# Patient Record
Sex: FEMALE | Race: BLACK OR AFRICAN AMERICAN | Employment: OTHER | URBAN - METROPOLITAN AREA
[De-identification: names, ages, dates, MRNs, and addresses within clinical notes are randomized per-mention and may not be internally consistent; named-entity substitution may affect disease eponyms.]

---

## 2017-05-31 ENCOUNTER — OFFICE VISIT (OUTPATIENT)
Dept: ORTHOPEDIC SURGERY | Age: 62
End: 2017-05-31

## 2017-05-31 VITALS
DIASTOLIC BLOOD PRESSURE: 67 MMHG | TEMPERATURE: 99.5 F | HEIGHT: 63 IN | HEART RATE: 98 BPM | SYSTOLIC BLOOD PRESSURE: 136 MMHG

## 2017-05-31 DIAGNOSIS — M25.571 BILATERAL ANKLE PAIN, UNSPECIFIED CHRONICITY: ICD-10-CM

## 2017-05-31 DIAGNOSIS — M25.572 BILATERAL ANKLE PAIN, UNSPECIFIED CHRONICITY: ICD-10-CM

## 2017-05-31 DIAGNOSIS — M67.02 ACHILLES TENDON CONTRACTURE, LEFT: ICD-10-CM

## 2017-05-31 DIAGNOSIS — M25.471 RIGHT ANKLE SWELLING: ICD-10-CM

## 2017-05-31 DIAGNOSIS — E11.42 DIABETIC POLYNEUROPATHY ASSOCIATED WITH TYPE 2 DIABETES MELLITUS (HCC): ICD-10-CM

## 2017-05-31 DIAGNOSIS — M25.472 ANKLE SWELLING, LEFT: Primary | ICD-10-CM

## 2017-05-31 RX ORDER — PREDNISONE 20 MG/1
TABLET ORAL
COMMUNITY

## 2017-05-31 RX ORDER — BUDESONIDE AND FORMOTEROL FUMARATE DIHYDRATE 160; 4.5 UG/1; UG/1
2 AEROSOL RESPIRATORY (INHALATION) 2 TIMES DAILY
COMMUNITY

## 2017-05-31 NOTE — PATIENT INSTRUCTIONS
Please follow up after PVL Studies (4 weeks). You are advised to contact us if your condition worsens. The provider has ordered a custom brace/orthotic for you. This will be customized and made for you by an outside facility. Please contact the orthotist at one of the below offices for your custom fitting and follow up in the office with the doctor or his physicians assistant 3 weeks after you have been wearing the brace. Rosangela Cruz at [facility]:     [address] [place] [address] Prem Mccauley    Phone: [phone]    You have been provided with an order for durable medical equipment that you may  at an outside facility as our office does not carry the equipment you need. You may pick it up at any medical supply company you like. Listed below are a few different locations for your convenience:    [address] [place] [address], [address] [place] [address] Street  Phone: [phone]    Leg and Ankle Edema: Care Instructions  Your Care Instructions  Swelling in the legs, ankles, and feet is called edema. It is common after you sit or stand for a while. Long plane flights or car rides often cause swelling in the legs and feet. You may also have swelling if you have to stand for long periods of time at your job. Problems with the veins in the legs (varicose veins) and changes in hormones can also cause swelling. Sometimes the swelling in the ankles and feet is caused by a more serious problem, such as heart failure, infection, blood clots, or liver or kidney disease. Follow-up care is a key part of your treatment and safety. Be sure to make and go to all appointments, and call your doctor if you are having problems. Its also a good idea to know your test results and keep a list of the medicines you take. How can you care for yourself at home? · If your doctor gave you medicine, take it as prescribed. Call your doctor if you think you are having a problem with your medicine.   · Whenever you are resting, raise your legs up. Try to keep the swollen area higher than the level of your heart. · Take breaks from standing or sitting in one position. ¨ Walk around to increase the blood flow in your lower legs. ¨ Move your feet and ankles often while you stand, or tighten and relax your leg muscles. · Wear support stockings. Put them on in the morning, before swelling gets worse. · Eat a balanced diet. Lose weight if you need to. · Limit the amount of salt (sodium) in your diet. Salt holds fluid in the body and may increase swelling. When should you call for help? Call 911 anytime you think you may need emergency care. For example, call if:  · You have symptoms of a blood clot in your lung (called a pulmonary embolism). These may include:  ¨ Sudden chest pain. ¨ Trouble breathing. ¨ Coughing up blood. Call your doctor now or seek immediate medical care if:  · You have signs of a blood clot, such as:  ¨ Pain in your calf, back of the knee, thigh, or groin. ¨ Redness and swelling in your leg or groin. · You have symptoms of infection, such as:  ¨ Increased pain, swelling, warmth, or redness. ¨ Red streaks or pus. ¨ A fever. Watch closely for changes in your health, and be sure to contact your doctor if:  · Your swelling is getting worse. · You have new or worsening pain in your legs. · You do not get better as expected. Where can you learn more? Go to http://don-myriam.info/. Enter F492 in the search box to learn more about \"Leg and Ankle Edema: Care Instructions. \"  Current as of: May 27, 2016  Content Version: 11.2  © 4515-7767 [employer]. Care instructions adapted under license by [employer] (which disclaims liability or warranty for this information).  If you have questions about a medical condition or this instruction, always ask your healthcare professional. [address]  any warranty or liability for your use of this information.

## 2017-05-31 NOTE — PROGRESS NOTES
AMBULATORY PROGRESS NOTE      Patient: Letty Rivas             MRN: [id number]     SSN: [id number] There is no height or weight on file to calculate BMI. YOB: 1955     AGE: 57 y.o. EX: female    PCP: No primary care provider on file. IMPRESSION/DIAGNOSIS AND TREATMENT PLAN     DIAGNOSES  1. Peripheral edema    2. Bilateral ankle pain, unspecified chronicity    3. Achilles tendon contracture, left    4. Diabetic polyneuropathy associated with type 2 diabetes mellitus ([address]        Orders Placed This Encounter    AMB SUPPLY ORDER    AMB SUPPLY ORDER    [10868] Ankle Min 3V    [10466] Ankle Min 3V    DUPLEX LOWER EXT VENOUS BILAT    REFERRAL TO NEUROLOGY      Letty Rivas understands her diagnoses and the proposed plan. My impression is a 77-year-old female with:    1. Diabetic peripheral neuropathy: I am going to get her to see a neurologist to manage her neuropathy. She has tried Lyrica in the past.  She is taking Neurontin as ordered by her PCP, Dr. Cortney Trivedi, but she is still having neuropathy and this really has not helped her much in terms of alleviating the burning pain in her lower legs. 2. Lower extremity edema:  Recommendation is for a PVL venous study of her lower legs, not a STAT study, to assess the valves of her legs. I am recommending compression stockings. 3. Difficulty walking due to weakness of her left Achilles tendon and some decreased range of motion of her left ankle and neuropathy:  Recommendation is for a hinged AFO brace. I am going to have her see Colleen Farley for custom hinged AFO braces. Plan:    1) PVL Venous Study BLE  2) Knee High Compression Stockings 8-10 mm Hg  3) Custom bilateral short hinged AFO braces  4) Referral to Neurology    RTO - after PVL Venous studies; 4 weeks    HPI AND EXAMINATION     Letty Rivas IS A 57 y.o. female who presents to my outpatient office complaining of: bilateral ankle pain and swelling.  Patient states that she has \"stocking/glove\" sensation across both of her legs. She also reports intermittent swelling. She endorses the swelling can cause her pain at times. Patient has a h/o diabetic neuropathy and DM. Patient is ambulating with a wheelchair and using oxygen. Patient states that her face became swollen after taking Lyrica. Medications include Gabapentin prescribed by her PCP. Pilar Feliz is alert/oriented (name, location, time) and follows commands well. she  is in no acute distress and her affect and mood are appropriate. Bilateral ANKLE and FOOT     Gait: wheelchair bound  Tenderness: mild diffuse tenderness of feet. Cutaneous: No rashes, skin patches, wounds, or abrasions to the lower legs           Warm and Normal color. No regions of expressible drainage. Skin color, texture, turgor normal. Normal.  Joint Motion: ROM Ankle: Decreased, Hindfoot: (ST,TN,CC) Decreased, Forefoot toes: Decreased    Weakness to dorsiflexion on the left side  Neurologic Exam: Neuro: Motor: no muscle wasting or atrophy; left-sided weakrnes to the LLE and              Sensory: Stocking glove sensation bilaterally. )dec sensation diffuse foot/ankle bilateral  Contractures: Gastrocnemius or Achilles Contractures moderate on the left side  Joint / Tendon Stability: No Ankle or Subtalar instability or joint laxity. No peroneal sublux ability or dislocation  Alignment: Normal Foot Alignment and Semi-Flexible  Vascular: 2+ distal tibia edema bilaterally  Lymphatic:  No Evidence of Lymphedema. CHART REVIEW     Past Medical History:   Diagnosis Date    Chronic obstructive pulmonary disease ([facility]     Hypertension     Sarcoidosis ([facility]      Current Outpatient Prescriptions   Medication Sig    budesonide-formoterol (SYMBICORT) 160-4.5 mcg/actuation HFA inhaler Take 2 Puffs by inhalation two (2) times a day.  denosumab (PROLIA) 60 mg/mL injection 60 mg by SubCUTAneous route.     predniSONE (DELTASONE) 20 mg tablet Take  by mouth daily (with breakfast). No current facility-administered medications for this visit. No Known Allergies  History reviewed. No pertinent surgical history. Social History     Occupational History    Not on file. Social History Main Topics    Smoking status: Never Smoker    Smokeless tobacco: Never Used    Alcohol use No    Drug use: No    Sexual activity: Not on file     History reviewed. No pertinent family history. REVIEW OF SYSTEMS : 5/31/2017  ALL BELOW ARE Negative except : SEE HPI       Constitutional: Negative for fever, chills and weight loss. Neg Weigh Loss  Cardiovascular: Negative for chest pain, claudication and leg swelling. SOB, WADE   Gastrointestinal: Negative for  pain, N/V/D/C, Blood in stool or urine,dysuria, hematuria,        Incontinence, pelvic pain  Musculoskeletal: see HPI. Neurological: Negative for dizziness and weakness. Negative for headaches,Visual Changes, Confusion, Seizures,   Psychiatric/Behavioral: Negative for depression, memory loss and substance abuse. Extremities:  Negative for  hair changes, rash or skin lesion changes. Hematologic: Negative for Bleeding problems, bruising, pallor or swollen lymph nodes. Peripheral Vascular: No calf pain, vascular vein tenderness to calf pain              No calf throbbing, posterior knee throbbing pain    DIAGNOSTIC IMAGING      Dictation on: 05/31/2017  9:52 AM by: Ameena Dougherty [[id number]]         Written by Елеан Pinto, as dictated by Bharat Verdin MD. Dr. MARIUM, Bharat Verdin MD, confirm that all documentation is accurate.

## 2017-05-31 NOTE — MR AVS SNAPSHOT
Visit Information Date & Time Provider Department Dept. Phone Encounter #  
 5/31/2017 10:00 AM Salty Ardon, [address] [facility] [phone] [id number] Follow-up Instructions Return in about 1 month (around 6/30/2017). Upcoming Health Maintenance Date Due Hepatitis C Screening 1955 DTaP/Tdap/Td series (1 - Tdap) 7/4/1975 PAP AKA CERVICAL CYTOLOGY 7/4/1975 BREAST CANCER SCRN MAMMOGRAM 7/4/2004 FOBT Q 1 YEAR AGE 50-75 7/4/2004 ZOSTER VACCINE AGE 60> 7/4/2014 INFLUENZA AGE 9 TO ADULT 8/1/2017 Allergies as of 5/31/2017  Review Complete On: 5/31/2017 By: Salty Ardon MD  
 No Known Allergies Current Immunizations  Never Reviewed No immunizations on file. Not reviewed this visit You Were Diagnosed With   
  
 Codes Comments Peripheral edema    -  Primary ICD-10-CM: R60.9 ICD-9-CM: 638. 3 Bilateral ankle pain, unspecified chronicity     ICD-10-CM: M25.571, M25.572 ICD-9-CM: 719.47 Achilles tendon contracture, left     ICD-10-CM: M67.02 
ICD-9-CM: 727.81 Diabetic polyneuropathy associated with type 2 diabetes mellitus ([facility]     ICD-10-CM: E11.42 
ICD-9-CM: 250.60, 357.2 Vitals BP Pulse Temp Height(growth percentile) Smoking Status 136/67 98 99.5 °F (37.5 °C) (Oral) 5' 3\" (1.6 m) Never Smoker Your Updated Medication List  
  
   
This list is accurate as of: 5/31/17 10:23 AM.  Always use your most recent med list.  
  
  
  
  
 predniSONE 20 mg tablet Commonly known as:  Marivel Raoul Take  by mouth daily (with breakfast). PROLIA 60 mg/mL injection Generic drug:  denosumab 60 mg by SubCUTAneous route. SYMBICORT 160-4.5 mcg/actuation HFA inhaler Generic drug:  budesonide-formoterol Take 2 Puffs by inhalation two (2) times a day. We Performed the Following AMB POC XRAY, ANKLE; COMPLETE, 3+ VIE [77477 CPT(R)] AMB POC XRAY, ANKLE; COMPLETE, 3+ VIE [75266 CPT(R)] AMB SUPPLY ORDER [[id number] Custom] Comments:  
 Knee High Compression Stockings 8-10 mm Hg AMB SUPPLY ORDER [[id number] Custom] Comments:  
 Order date: 5/31/2017 Custom bilateral short hinged AFO braces REFERRAL TO NEUROLOGY [VKL29 Custom] Comments:  
 Evaluation and treatment of BLE diabetic neuropathy. Follow-up Instructions Return in about 1 month (around 6/30/2017). To-Do List   
 05/31/2017 Imaging:  DUPLEX LOWER EXT VENOUS BILAT Referral Information Referral ID Referred By Referred To  
  
 [id number] Nancy Alejandre MD   
   [address] [facility] [address] [place], [address]  Phone: [phone] Fax: [phone] Visits Status Start Date End Date 1 New Request 5/31/17 5/31/18 If your referral has a status of pending review or denied, additional information will be sent to support the outcome of this decision. Patient Instructions Please follow up after PVL Studies (4 weeks). You are advised to contact us if your condition worsens. The provider has ordered a custom brace/orthotic for you. This will be customized and made for you by an outside facility. Please contact the orthotist at one of the below offices for your custom fitting and follow up in the office with the doctor or his physicians assistant 3 weeks after you have been wearing the brace. Loyd Hidden at [facility]:  
 
[address] [place] [address] Phone: [phone] You have been provided with an order for durable medical equipment that you may  at an outside facility as our office does not carry the equipment you need. You may pick it up at any medical supply company you like. Listed below are a few different locations for your convenience: [facility] [address] [place] [address] Street Phone: [phone] Leg and Ankle Edema: Care Instructions Your Care Instructions Swelling in the legs, ankles, and feet is called edema. It is common after you sit or stand for a while. Long plane flights or car rides often cause swelling in the legs and feet. You may also have swelling if you have to stand for long periods of time at your job. Problems with the veins in the legs (varicose veins) and changes in hormones can also cause swelling. Sometimes the swelling in the ankles and feet is caused by a more serious problem, such as heart failure, infection, blood clots, or liver or kidney disease. Follow-up care is a key part of your treatment and safety. Be sure to make and go to all appointments, and call your doctor if you are having problems. Its also a good idea to know your test results and keep a list of the medicines you take. How can you care for yourself at home? · If your doctor gave you medicine, take it as prescribed. Call your doctor if you think you are having a problem with your medicine. · Whenever you are resting, raise your legs up. Try to keep the swollen area higher than the level of your heart. · Take breaks from standing or sitting in one position. ¨ Walk around to increase the blood flow in your lower legs. ¨ Move your feet and ankles often while you stand, or tighten and relax your leg muscles. · Wear support stockings. Put them on in the morning, before swelling gets worse. · Eat a balanced diet. Lose weight if you need to. · Limit the amount of salt (sodium) in your diet. Salt holds fluid in the body and may increase swelling. When should you call for help? Call 911 anytime you think you may need emergency care. For example, call if: 
· You have symptoms of a blood clot in your lung (called a pulmonary embolism). These may include: 
¨ Sudden chest pain. ¨ Trouble breathing. ¨ Coughing up blood. Call your doctor now or seek immediate medical care if: · You have signs of a blood clot, such as: 
¨ Pain in your calf, back of the knee, thigh, or groin. ¨ Redness and swelling in your leg or groin. · You have symptoms of infection, such as: 
¨ Increased pain, swelling, warmth, or redness. ¨ Red streaks or pus. ¨ A fever. Watch closely for changes in your health, and be sure to contact your doctor if: 
· Your swelling is getting worse. · You have new or worsening pain in your legs. · You do not get better as expected. Where can you learn more? Go to http://don-myriam.info/. Enter Q563 in the search box to learn more about \"Leg and Ankle Edema: Care Instructions. \" Current as of: May 27, 2016 Content Version: 11.2 © 1743-9499 [employer]. Care instructions adapted under license by [employer] (which disclaims liability or warranty for this information). If you have questions about a medical condition or this instruction, always ask your healthcare professional. [address] any warranty or liability for your use of this information. Introducing [facility]! Marta Sorensen introduces [employer] patient portal. Now you can access parts of your medical record, email your doctor's office, and request medication refills online. 1. In your internet browser, go to [email or web address] [employer] 2. Click on the First Time User? Click Here link in the Sign In box. You will see the New Member Sign Up page. 3. Enter your [employer] Access Code exactly as it appears below. You will not need to use this code after youve completed the sign-up process. If you do not sign up before the expiration date, you must request a new code. · [employer] Access Code: [email or web address] Expires: 8/29/2017 10:23 AM 
 
4. Enter the last four digits of your Social Security Number (xxxx) and Date of Birth (mm/dd/yyyy) as indicated and click Submit. You will be taken to the next sign-up page. 5. Create a Me[employer] ID. This will be your [employer] login ID and cannot be changed, so think of one that is secure and easy to remember. 6. Create a [employer] password. You can change your password at any time. 7. Enter your Password Reset Question and Answer. This can be used at a later time if you forget your password. 8. Enter your e-mail address. You will receive e-mail notification when new information is available in [address] 9. Click Sign Up. You can now view and download portions of your medical record. 10. Click the Download Summary menu link to download a portable copy of your medical information. If you have questions, please visit the Frequently Asked Questions section of the [employer] website. Remember, [employer] is NOT to be used for urgent needs. For medical emergencies, dial 911. Now available from your iPhone and Android! Please provide this summary of care documentation to your next provider. If you have any questions after today's visit, please call [phone].

## 2017-05-31 NOTE — PROCEDURES
X-rays of the right ankle, three views: There is some soft tissue swelling seen medially and laterally. There are no acute fracture, subluxation, or dislocation on this right side. There is some spurring to the inferior surface of the right calcaneus in the lateral radiographic x-ray. It is not a perfect lateral x-ray. Three views of the left ankle: There is circumferential swelling medially and laterally. The ankle joint is well-maintained. There is no fracture, subluxation, or dislocation. No osteochondral defects are seen. There is some spurring to the inferior surface of the left calcaneus. The lateral x-ray, it is not a perfect x-ray, as such, the ankle and subtalar joints are not well seen in this lateral image.

## 2018-06-23 ENCOUNTER — ED HISTORICAL/CONVERTED ENCOUNTER (OUTPATIENT)
Dept: OTHER | Age: 63
End: 2018-06-23

## 2018-06-27 ENCOUNTER — OP HISTORICAL/CONVERTED ENCOUNTER (OUTPATIENT)
Dept: OTHER | Age: 63
End: 2018-06-27

## 2019-11-11 ENCOUNTER — OP HISTORICAL/CONVERTED ENCOUNTER (OUTPATIENT)
Dept: OTHER | Age: 64
End: 2019-11-11

## 2020-01-01 ENCOUNTER — APPOINTMENT (OUTPATIENT)
Dept: NON INVASIVE DIAGNOSTICS | Age: 65
End: 2020-01-01
Attending: INTERNAL MEDICINE
Payer: COMMERCIAL

## 2020-01-01 ENCOUNTER — APPOINTMENT (OUTPATIENT)
Dept: GENERAL RADIOLOGY | Age: 65
End: 2020-01-01
Attending: NURSE PRACTITIONER
Payer: COMMERCIAL

## 2020-01-01 ENCOUNTER — APPOINTMENT (OUTPATIENT)
Dept: GENERAL RADIOLOGY | Age: 65
End: 2020-01-01
Attending: RADIOLOGY
Payer: COMMERCIAL

## 2020-01-01 ENCOUNTER — APPOINTMENT (OUTPATIENT)
Dept: CT IMAGING | Age: 65
End: 2020-01-01
Attending: NURSE PRACTITIONER
Payer: COMMERCIAL

## 2020-01-01 ENCOUNTER — OP HISTORICAL/CONVERTED ENCOUNTER (OUTPATIENT)
Dept: OTHER | Age: 65
End: 2020-01-01

## 2020-01-01 ENCOUNTER — HOSPITAL ENCOUNTER (OUTPATIENT)
Age: 65
Setting detail: OBSERVATION
Discharge: ANOTHER HOSPITAL | End: 2020-12-05
Attending: FAMILY MEDICINE | Admitting: FAMILY MEDICINE
Payer: COMMERCIAL

## 2020-01-01 ENCOUNTER — APPOINTMENT (OUTPATIENT)
Dept: GENERAL RADIOLOGY | Age: 65
End: 2020-01-01
Attending: FAMILY MEDICINE
Payer: COMMERCIAL

## 2020-01-01 ENCOUNTER — APPOINTMENT (OUTPATIENT)
Dept: GENERAL RADIOLOGY | Age: 65
DRG: 720 | End: 2020-01-01
Attending: NURSE PRACTITIONER
Payer: COMMERCIAL

## 2020-01-01 ENCOUNTER — APPOINTMENT (OUTPATIENT)
Dept: GENERAL RADIOLOGY | Age: 65
End: 2020-01-01
Attending: INTERNAL MEDICINE
Payer: COMMERCIAL

## 2020-01-01 ENCOUNTER — HOSPITAL ENCOUNTER (INPATIENT)
Age: 65
LOS: 2 days | DRG: 720 | End: 2020-12-07
Attending: STUDENT IN AN ORGANIZED HEALTH CARE EDUCATION/TRAINING PROGRAM | Admitting: SURGERY
Payer: COMMERCIAL

## 2020-01-01 ENCOUNTER — APPOINTMENT (OUTPATIENT)
Dept: INTERVENTIONAL RADIOLOGY/VASCULAR | Age: 65
End: 2020-01-01
Attending: INTERNAL MEDICINE
Payer: COMMERCIAL

## 2020-01-01 VITALS
BODY MASS INDEX: 33.63 KG/M2 | HEART RATE: 112 BPM | OXYGEN SATURATION: 96 % | RESPIRATION RATE: 15 BRPM | WEIGHT: 182.76 LBS | SYSTOLIC BLOOD PRESSURE: 109 MMHG | DIASTOLIC BLOOD PRESSURE: 83 MMHG | TEMPERATURE: 95.9 F | HEIGHT: 62 IN

## 2020-01-01 VITALS
TEMPERATURE: 97.3 F | WEIGHT: 182.98 LBS | BODY MASS INDEX: 33.47 KG/M2 | RESPIRATION RATE: 9 BRPM | HEART RATE: 79 BPM | DIASTOLIC BLOOD PRESSURE: 54 MMHG | OXYGEN SATURATION: 100 % | SYSTOLIC BLOOD PRESSURE: 76 MMHG

## 2020-01-01 DIAGNOSIS — R73.9 HYPERGLYCEMIA: ICD-10-CM

## 2020-01-01 DIAGNOSIS — R41.82 ALTERED MENTAL STATUS, UNSPECIFIED ALTERED MENTAL STATUS TYPE: Primary | ICD-10-CM

## 2020-01-01 DIAGNOSIS — I95.9 HYPOTENSION, UNSPECIFIED HYPOTENSION TYPE: ICD-10-CM

## 2020-01-01 DIAGNOSIS — E87.70 HYPERVOLEMIA, UNSPECIFIED HYPERVOLEMIA TYPE: ICD-10-CM

## 2020-01-01 LAB
ALBUMIN SERPL-MCNC: 1.5 G/DL (ref 3.5–5)
ALBUMIN SERPL-MCNC: 1.8 G/DL (ref 3.5–5)
ALBUMIN SERPL-MCNC: 2 G/DL (ref 3.5–5)
ALBUMIN SERPL-MCNC: 3 G/DL (ref 3.5–5)
ALBUMIN/GLOB SERPL: 0.4 {RATIO} (ref 1.1–2.2)
ALBUMIN/GLOB SERPL: 0.5 {RATIO} (ref 1.1–2.2)
ALBUMIN/GLOB SERPL: 0.5 {RATIO} (ref 1.1–2.2)
ALP SERPL-CCNC: 154 U/L (ref 45–117)
ALP SERPL-CCNC: 155 U/L (ref 45–117)
ALP SERPL-CCNC: 252 U/L (ref 45–117)
ALT SERPL-CCNC: 46 U/L (ref 12–78)
ALT SERPL-CCNC: 48 U/L (ref 12–78)
ALT SERPL-CCNC: 54 U/L (ref 12–78)
AMPHET UR QL SCN: NEGATIVE
AMYLASE SERPL-CCNC: 237 U/L (ref 25–115)
ANION GAP SERPL CALC-SCNC: 10 MMOL/L (ref 5–15)
ANION GAP SERPL CALC-SCNC: 11 MMOL/L (ref 5–15)
ANION GAP SERPL CALC-SCNC: 9 MMOL/L (ref 5–15)
ANION GAP SERPL CALC-SCNC: 9 MMOL/L (ref 5–15)
APPEARANCE UR: ABNORMAL
AST SERPL W P-5'-P-CCNC: 40 U/L (ref 15–37)
AST SERPL W P-5'-P-CCNC: ABNORMAL U/L (ref 15–37)
AST SERPL-CCNC: 101 U/L (ref 15–37)
ATRIAL RATE: 124 BPM
ATRIAL RATE: 97 BPM
BACTERIA SPEC CULT: ABNORMAL
BACTERIA SPEC CULT: NORMAL
BACTERIA URNS QL MICRO: ABNORMAL /HPF
BARBITURATES UR QL SCN: NEGATIVE
BASE DEFICIT BLDA-SCNC: 16.2 MMOL/L (ref 0–2)
BASE DEFICIT BLDA-SCNC: 6.6 MMOL/L (ref 0–2)
BASOPHILS # BLD: 0 K/UL (ref 0–0.1)
BASOPHILS # BLD: 0 K/UL (ref 0–0.1)
BASOPHILS # BLD: 0.1 K/UL (ref 0–0.1)
BASOPHILS NFR BLD: 0 % (ref 0–1)
BASOPHILS NFR BLD: 0 % (ref 0–1)
BASOPHILS NFR BLD: 2 % (ref 0–1)
BDY SITE: ABNORMAL
BENZODIAZ UR QL: NEGATIVE
BILIRUB SERPL-MCNC: 0.5 MG/DL (ref 0.2–1)
BILIRUB SERPL-MCNC: 0.7 MG/DL (ref 0.2–1)
BILIRUB SERPL-MCNC: 0.8 MG/DL (ref 0.2–1)
BILIRUB UR QL: NEGATIVE
BNP SERPL-MCNC: 923 PG/ML
BNP SERPL-MCNC: ABNORMAL PG/ML
BUN SERPL-MCNC: 12 MG/DL (ref 6–20)
BUN SERPL-MCNC: 43 MG/DL (ref 6–20)
BUN SERPL-MCNC: 43 MG/DL (ref 6–20)
BUN SERPL-MCNC: 45 MG/DL (ref 6–20)
BUN/CREAT SERPL: 12 (ref 12–20)
BUN/CREAT SERPL: 15 (ref 12–20)
BUN/CREAT SERPL: 15 (ref 12–20)
BUN/CREAT SERPL: 16 (ref 12–20)
CA-I BLD-MCNC: 7.5 MG/DL (ref 8.5–10.1)
CA-I BLD-MCNC: 8.3 MG/DL (ref 8.5–10.1)
CALCIUM SERPL-MCNC: 6.8 MG/DL (ref 8.5–10.1)
CALCIUM SERPL-MCNC: 8.6 MG/DL (ref 8.5–10.1)
CALCULATED P AXIS, ECG09: 1 DEGREES
CALCULATED P AXIS, ECG09: 13 DEGREES
CALCULATED R AXIS, ECG10: -5 DEGREES
CALCULATED R AXIS, ECG10: -8 DEGREES
CALCULATED T AXIS, ECG11: 131 DEGREES
CALCULATED T AXIS, ECG11: 28 DEGREES
CANNABINOIDS UR QL SCN: NEGATIVE
CHLORIDE SERPL-SCNC: 105 MMOL/L (ref 97–108)
CHLORIDE SERPL-SCNC: 105 MMOL/L (ref 97–108)
CHLORIDE SERPL-SCNC: 112 MMOL/L (ref 97–108)
CHLORIDE SERPL-SCNC: 114 MMOL/L (ref 97–108)
CO2 SERPL-SCNC: 19 MMOL/L (ref 21–32)
CO2 SERPL-SCNC: 19 MMOL/L (ref 21–32)
CO2 SERPL-SCNC: 23 MMOL/L (ref 21–32)
CO2 SERPL-SCNC: 23 MMOL/L (ref 21–32)
COCAINE UR QL SCN: NEGATIVE
COLONY COUNT,CNT: NORMAL
COLOR UR: ABNORMAL
COVID-19 RAPID TEST, COVR: NOT DETECTED
CREAT SERPL-MCNC: 0.97 MG/DL (ref 0.55–1.02)
CREAT SERPL-MCNC: 2.76 MG/DL (ref 0.55–1.02)
CREAT SERPL-MCNC: 2.88 MG/DL (ref 0.55–1.02)
CREAT SERPL-MCNC: 2.89 MG/DL (ref 0.55–1.02)
CREAT UR-MCNC: 15 MG/DL
CRP SERPL-MCNC: 63.1 MG/DL (ref 0–0.6)
DATE LAST DOSE: NORMAL
DIAGNOSIS, 93000: NORMAL
DIAGNOSIS, 93000: NORMAL
DIFFERENTIAL METHOD BLD: ABNORMAL
DRUG SCRN COMMENT,DRGCM: NORMAL
ECHO AV PEAK GRADIENT: 11 MMHG
ECHO EST RA PRESSURE: 3 MMHG
ECHO LV E' SEPTAL VELOCITY: 6.3 CM/S
ECHO LV EDV A2C: 62.6 CM3
ECHO LV EJECTION FRACTION A2C: 27 %
ECHO LV EJECTION FRACTION BIPLANE: 61.5 % (ref 55–100)
ECHO LV ESV A2C: 24.1 CM3
ECHO LV INTERNAL DIMENSION DIASTOLIC: 3.97 CM (ref 3.9–5.3)
ECHO LV INTERNAL DIMENSION SYSTOLIC: 2.89 CM
ECHO LV IVSD: 1.08 CM (ref 0.6–0.9)
ECHO LV MASS 2D: 138.3 G (ref 67–162)
ECHO LV MASS INDEX 2D: 76.9 G/M2 (ref 43–95)
ECHO LV POSTERIOR WALL DIASTOLIC: 1.06 CM (ref 0.6–0.9)
ECHO LVOT PEAK GRADIENT: 4 MMHG
ECHO MV A VELOCITY: 85.4 CM/S
ECHO MV E DECELERATION TIME (DT): 90 MS
ECHO MV E VELOCITY: 81 CM/S
ECHO MV E/A RATIO: 0.95
ECHO MV E/E' SEPTAL: 12.86
ECHO PV PEAK INSTANTANEOUS GRADIENT SYSTOLIC: 4 MMHG
ECHO PV PEAK INSTANTANEOUS GRADIENT SYSTOLIC: 4 MMHG
ECHO PV REGURGITANT MAX VELOCITY: 101 CM/S
ECHO PV REGURGITANT MAX VELOCITY: 103 CM/S
ECHO PV REGURGITANT MAX VELOCITY: 169 CM/S
ECHO PV REGURGITANT MAX VELOCITY: 96.3 CM/S
ECHO RIGHT VENTRICULAR SYSTOLIC PRESSURE (RVSP): 67 MMHG
ECHO RV INTERNAL DIMENSION: 3.49 CM
ECHO TV MAX VELOCITY: 401 CM/S
ECHO TV REGURGITANT PEAK GRADIENT: 64 MMHG
EOSINOPHIL # BLD: 0 K/UL (ref 0–0.4)
EOSINOPHIL # BLD: 0.1 K/UL (ref 0–0.4)
EOSINOPHIL # BLD: 0.1 K/UL (ref 0–0.4)
EOSINOPHIL NFR BLD: 0 % (ref 0–7)
EOSINOPHIL NFR BLD: 1 % (ref 0–7)
EOSINOPHIL NFR BLD: 1 % (ref 0–7)
ERYTHROCYTE [DISTWIDTH] IN BLOOD BY AUTOMATED COUNT: 19.8 % (ref 11.5–14.5)
ERYTHROCYTE [DISTWIDTH] IN BLOOD BY AUTOMATED COUNT: 19.9 % (ref 11.5–14.5)
ERYTHROCYTE [DISTWIDTH] IN BLOOD BY AUTOMATED COUNT: 20.1 % (ref 11.5–14.5)
EST. AVERAGE GLUCOSE BLD GHB EST-MCNC: 278 MG/DL
FIO2 ON VENT: 36 %
FLUAV AG NPH QL IA: NEGATIVE
FLUBV AG NOSE QL IA: NEGATIVE
GAS FLOW.O2 O2 DELIVERY SYS: 2 L/MIN
GAS FLOW.O2 O2 DELIVERY SYS: 4 L/MIN
GLOBULIN SER CALC-MCNC: 3.7 G/DL (ref 2–4)
GLOBULIN SER CALC-MCNC: 3.7 G/DL (ref 2–4)
GLOBULIN SER CALC-MCNC: 4.1 G/DL (ref 2–4)
GLUCOSE BLD STRIP.AUTO-MCNC: 126 MG/DL (ref 65–100)
GLUCOSE BLD STRIP.AUTO-MCNC: 151 MG/DL (ref 65–100)
GLUCOSE BLD STRIP.AUTO-MCNC: 279 MG/DL (ref 65–100)
GLUCOSE BLD STRIP.AUTO-MCNC: 81 MG/DL (ref 65–100)
GLUCOSE SERPL-MCNC: 146 MG/DL (ref 65–100)
GLUCOSE SERPL-MCNC: 174 MG/DL (ref 65–100)
GLUCOSE SERPL-MCNC: 405 MG/DL (ref 65–100)
GLUCOSE SERPL-MCNC: 96 MG/DL (ref 65–100)
GLUCOSE UR STRIP.AUTO-MCNC: >300 MG/DL
HBA1C MFR BLD: 11.3 % (ref 4–5.6)
HBV SURFACE AB SER QL: NONREACTIVE
HBV SURFACE AB SER-ACNC: <3.1 MIU/ML
HBV SURFACE AG SER QL: <0.1 INDEX
HBV SURFACE AG SER QL: NEGATIVE
HCO3 BLDA-SCNC: 11 MMOL/L (ref 22–26)
HCO3 BLDA-SCNC: 19 MMOL/L (ref 22–26)
HCT VFR BLD AUTO: 22.7 % (ref 35–47)
HCT VFR BLD AUTO: 30.2 % (ref 35–47)
HCT VFR BLD AUTO: 31.4 % (ref 35–47)
HGB BLD-MCNC: 6.7 G/DL (ref 11.5–16)
HGB BLD-MCNC: 9.2 G/DL (ref 11.5–16)
HGB BLD-MCNC: 9.6 G/DL (ref 11.5–16)
HGB UR QL STRIP: ABNORMAL
HISTORY CHECKED?,CKHIST: NORMAL
IMM GRANULOCYTES # BLD AUTO: 0 K/UL
IMM GRANULOCYTES NFR BLD AUTO: 0 %
INR PPP: 1.2 (ref 0.9–1.1)
KETONES UR QL STRIP.AUTO: NEGATIVE MG/DL
LACTATE SERPL-SCNC: 0.7 MMOL/L (ref 0.4–2)
LACTATE SERPL-SCNC: 0.9 MMOL/L (ref 0.4–2)
LACTATE SERPL-SCNC: 2 MMOL/L (ref 0.4–2)
LDH SERPL L TO P-CCNC: 591 U/L (ref 81–246)
LEUKOCYTE ESTERASE UR QL STRIP.AUTO: ABNORMAL
LIPASE SERPL-CCNC: 54 U/L (ref 73–393)
LYMPHOCYTES # BLD: 0.9 K/UL (ref 0.8–3.5)
LYMPHOCYTES # BLD: 1.1 K/UL (ref 0.8–3.5)
LYMPHOCYTES # BLD: 1.9 K/UL (ref 0.8–3.5)
LYMPHOCYTES NFR BLD: 14 % (ref 12–49)
LYMPHOCYTES NFR BLD: 21 % (ref 12–49)
LYMPHOCYTES NFR BLD: 9 % (ref 12–49)
MAGNESIUM SERPL-MCNC: 1.9 MG/DL (ref 1.6–2.4)
MAGNESIUM SERPL-MCNC: 2.1 MG/DL (ref 1.6–2.4)
MCH RBC QN AUTO: 30 PG (ref 26–34)
MCH RBC QN AUTO: 30.5 PG (ref 26–34)
MCH RBC QN AUTO: 30.5 PG (ref 26–34)
MCHC RBC AUTO-ENTMCNC: 29.5 G/DL (ref 30–36.5)
MCHC RBC AUTO-ENTMCNC: 30.5 G/DL (ref 30–36.5)
MCHC RBC AUTO-ENTMCNC: 30.6 G/DL (ref 30–36.5)
MCV RBC AUTO: 100 FL (ref 80–99)
MCV RBC AUTO: 103.2 FL (ref 80–99)
MCV RBC AUTO: 98.1 FL (ref 80–99)
METAMYELOCYTES NFR BLD MANUAL: 1 %
METAMYELOCYTES NFR BLD MANUAL: 11 %
METHADONE UR QL: NEGATIVE
MONOCYTES # BLD: 0.2 K/UL (ref 0–1)
MONOCYTES # BLD: 1 K/UL (ref 0–1)
MONOCYTES # BLD: 1.5 K/UL (ref 0–1)
MONOCYTES NFR BLD: 14 % (ref 5–13)
MONOCYTES NFR BLD: 3 % (ref 5–13)
MONOCYTES NFR BLD: 7 % (ref 5–13)
MRSA DNA SPEC QL NAA+PROBE: DETECTED
MYELOCYTES NFR BLD MANUAL: 3 %
MYELOCYTES NFR BLD MANUAL: 6 %
NEUTS BAND NFR BLD MANUAL: 5 % (ref 0–6)
NEUTS SEG # BLD: 3.8 K/UL (ref 1.8–8)
NEUTS SEG # BLD: 7.6 K/UL (ref 1.8–8)
NEUTS SEG # BLD: 7.9 K/UL (ref 1.8–8)
NEUTS SEG NFR BLD: 58 % (ref 32–75)
NEUTS SEG NFR BLD: 67 % (ref 32–75)
NEUTS SEG NFR BLD: 74 % (ref 32–75)
NITRITE UR QL STRIP.AUTO: NEGATIVE
NRBC # BLD: 0.07 K/UL (ref 0–0.01)
NRBC # BLD: 0.19 K/UL (ref 0–0.01)
NRBC # BLD: 0.28 K/UL (ref 0–0.01)
NRBC BLD-RTO: 0.7 PER 100 WBC
NRBC BLD-RTO: 1.4 PER 100 WBC
NRBC BLD-RTO: 5.4 PER 100 WBC
OPIATES UR QL: NEGATIVE
P-R INTERVAL, ECG05: 124 MS
P-R INTERVAL, ECG05: 128 MS
PCO2 BLDA: 27 MMHG (ref 35–45)
PCO2 BLDA: 39 MMHG (ref 35–45)
PCP UR QL: NEGATIVE
PERFORMED BY, TECHID: ABNORMAL
PERFORMED BY, TECHID: NORMAL
PH BLDA: 7.19 [PH] (ref 7.35–7.45)
PH BLDA: 7.3 [PH] (ref 7.35–7.45)
PH UR STRIP: 5 [PH] (ref 5–8)
PHOSPHATE SERPL-MCNC: 2.2 MG/DL (ref 2.6–4.7)
PHOSPHATE SERPL-MCNC: 5.4 MG/DL (ref 2.6–4.7)
PLATELET # BLD AUTO: 121 K/UL (ref 150–400)
PLATELET # BLD AUTO: 179 K/UL (ref 150–400)
PLATELET # BLD AUTO: 197 K/UL (ref 150–400)
PLATELET COMMENTS,PCOM: ABNORMAL
PMV BLD AUTO: 10 FL (ref 8.9–12.9)
PMV BLD AUTO: 11.4 FL (ref 8.9–12.9)
PMV BLD AUTO: 9.4 FL (ref 8.9–12.9)
PO2 BLDA: 134 MMHG (ref 75–100)
PO2 BLDA: 20 MMHG (ref 75–100)
POTASSIUM SERPL-SCNC: 3.5 MMOL/L (ref 3.5–5.1)
POTASSIUM SERPL-SCNC: 4.3 MMOL/L (ref 3.5–5.1)
POTASSIUM SERPL-SCNC: 5 MMOL/L (ref 3.5–5.1)
POTASSIUM SERPL-SCNC: ABNORMAL MMOL/L (ref 3.5–5.1)
PROCALCITONIN SERPL-MCNC: 27.06 NG/ML
PROCALCITONIN SERPL-MCNC: 43.06 NG/ML
PROCALCITONIN SERPL-MCNC: 55.89 NG/ML
PROMYELOCYTES NFR BLD MANUAL: 3 %
PROT SERPL-MCNC: 5.5 G/DL (ref 6.4–8.2)
PROT SERPL-MCNC: 5.6 G/DL (ref 6.4–8.2)
PROT SERPL-MCNC: 5.7 G/DL (ref 6.4–8.2)
PROT UR STRIP-MCNC: 100 MG/DL
PROTHROMBIN TIME: 12.4 SEC (ref 9–11.1)
Q-T INTERVAL, ECG07: 280 MS
Q-T INTERVAL, ECG07: 350 MS
QRS DURATION, ECG06: 84 MS
QRS DURATION, ECG06: 94 MS
QTC CALCULATION (BEZET), ECG08: 402 MS
QTC CALCULATION (BEZET), ECG08: 444 MS
RBC # BLD AUTO: 2.2 M/UL (ref 3.8–5.2)
RBC # BLD AUTO: 3.02 M/UL (ref 3.8–5.2)
RBC # BLD AUTO: 3.2 M/UL (ref 3.8–5.2)
RBC #/AREA URNS HPF: >100 /HPF (ref 0–5)
RBC MORPH BLD: ABNORMAL
REPORTED DOSE,DOSE: NORMAL UNITS
SAO2 % BLD: 100 %
SAO2 % BLD: 27 %
SAO2% DEVICE SAO2% SENSOR NAME: ABNORMAL
SARS-COV-2, COV2: NORMAL
SERVICE CMNT-IMP: ABNORMAL
SODIUM SERPL-SCNC: 137 MMOL/L (ref 136–145)
SODIUM SERPL-SCNC: 139 MMOL/L (ref 136–145)
SODIUM SERPL-SCNC: 141 MMOL/L (ref 136–145)
SODIUM SERPL-SCNC: 142 MMOL/L (ref 136–145)
SODIUM UR-SCNC: 98 MMOL/L
SP GR UR REFRACTOMETRY: 1.01 (ref 1–1.03)
SPECIAL REQUESTS,SREQ: ABNORMAL
SPECIAL REQUESTS,SREQ: ABNORMAL
SPECIAL REQUESTS,SREQ: NORMAL
SPECIMEN SOURCE: NORMAL
TROPONIN I SERPL-MCNC: 0.36 NG/ML
TROPONIN I SERPL-MCNC: 0.38 NG/ML
TROPONIN I SERPL-MCNC: <0.05 NG/ML
UROBILINOGEN UR QL STRIP.AUTO: 0.1 EU/DL (ref 0.1–1)
UUN UR-MCNC: 66 MG/DL
VANCOMYCIN SERPL-MCNC: 11.3 UG/ML
VENTRICULAR RATE, ECG03: 124 BPM
VENTRICULAR RATE, ECG03: 97 BPM
WBC # BLD AUTO: 10.5 K/UL (ref 3.6–11)
WBC # BLD AUTO: 13.6 K/UL (ref 3.6–11)
WBC # BLD AUTO: 5.2 K/UL (ref 3.6–11)
WBC URNS QL MICRO: >100 /HPF (ref 0–4)

## 2020-01-01 PROCEDURE — 77010033711 HC HIGH FLOW OXYGEN

## 2020-01-01 PROCEDURE — 82803 BLOOD GASES ANY COMBINATION: CPT

## 2020-01-01 PROCEDURE — 74011000250 HC RX REV CODE- 250: Performed by: NURSE PRACTITIONER

## 2020-01-01 PROCEDURE — 80307 DRUG TEST PRSMV CHEM ANLYZR: CPT

## 2020-01-01 PROCEDURE — 84145 PROCALCITONIN (PCT): CPT

## 2020-01-01 PROCEDURE — 87641 MR-STAPH DNA AMP PROBE: CPT

## 2020-01-01 PROCEDURE — 82962 GLUCOSE BLOOD TEST: CPT

## 2020-01-01 PROCEDURE — 74011250636 HC RX REV CODE- 250/636: Performed by: FAMILY MEDICINE

## 2020-01-01 PROCEDURE — 36600 WITHDRAWAL OF ARTERIAL BLOOD: CPT

## 2020-01-01 PROCEDURE — 87804 INFLUENZA ASSAY W/OPTIC: CPT

## 2020-01-01 PROCEDURE — 84484 ASSAY OF TROPONIN QUANT: CPT

## 2020-01-01 PROCEDURE — 77010033678 HC OXYGEN DAILY

## 2020-01-01 PROCEDURE — 65610000006 HC RM INTENSIVE CARE

## 2020-01-01 PROCEDURE — 83605 ASSAY OF LACTIC ACID: CPT

## 2020-01-01 PROCEDURE — 96375 TX/PRO/DX INJ NEW DRUG ADDON: CPT

## 2020-01-01 PROCEDURE — 96366 THER/PROPH/DIAG IV INF ADDON: CPT

## 2020-01-01 PROCEDURE — 82150 ASSAY OF AMYLASE: CPT

## 2020-01-01 PROCEDURE — 83880 ASSAY OF NATRIURETIC PEPTIDE: CPT

## 2020-01-01 PROCEDURE — 74011250636 HC RX REV CODE- 250/636: Performed by: NURSE PRACTITIONER

## 2020-01-01 PROCEDURE — 74011000250 HC RX REV CODE- 250: Performed by: INTERNAL MEDICINE

## 2020-01-01 PROCEDURE — 80053 COMPREHEN METABOLIC PANEL: CPT

## 2020-01-01 PROCEDURE — 96374 THER/PROPH/DIAG INJ IV PUSH: CPT

## 2020-01-01 PROCEDURE — 85025 COMPLETE CBC W/AUTO DIFF WBC: CPT

## 2020-01-01 PROCEDURE — 74011250636 HC RX REV CODE- 250/636: Performed by: INTERNAL MEDICINE

## 2020-01-01 PROCEDURE — 87186 SC STD MICRODIL/AGAR DIL: CPT

## 2020-01-01 PROCEDURE — 84100 ASSAY OF PHOSPHORUS: CPT

## 2020-01-01 PROCEDURE — 74011250637 HC RX REV CODE- 250/637: Performed by: FAMILY MEDICINE

## 2020-01-01 PROCEDURE — 96372 THER/PROPH/DIAG INJ SC/IM: CPT

## 2020-01-01 PROCEDURE — 74011000250 HC RX REV CODE- 250: Performed by: FAMILY MEDICINE

## 2020-01-01 PROCEDURE — 74011250636 HC RX REV CODE- 250/636: Performed by: EMERGENCY MEDICINE

## 2020-01-01 PROCEDURE — 71045 X-RAY EXAM CHEST 1 VIEW: CPT

## 2020-01-01 PROCEDURE — 90945 DIALYSIS ONE EVALUATION: CPT

## 2020-01-01 PROCEDURE — 74011250636 HC RX REV CODE- 250/636

## 2020-01-01 PROCEDURE — 74011000258 HC RX REV CODE- 258: Performed by: FAMILY MEDICINE

## 2020-01-01 PROCEDURE — 99218 HC RM OBSERVATION: CPT

## 2020-01-01 PROCEDURE — 74011000250 HC RX REV CODE- 250

## 2020-01-01 PROCEDURE — 96365 THER/PROPH/DIAG IV INF INIT: CPT

## 2020-01-01 PROCEDURE — 74011250637 HC RX REV CODE- 250/637: Performed by: NURSE PRACTITIONER

## 2020-01-01 PROCEDURE — 80048 BASIC METABOLIC PNL TOTAL CA: CPT

## 2020-01-01 PROCEDURE — P9045 ALBUMIN (HUMAN), 5%, 250 ML: HCPCS | Performed by: NURSE PRACTITIONER

## 2020-01-01 PROCEDURE — 82040 ASSAY OF SERUM ALBUMIN: CPT

## 2020-01-01 PROCEDURE — 99285 EMERGENCY DEPT VISIT HI MDM: CPT

## 2020-01-01 PROCEDURE — 87040 BLOOD CULTURE FOR BACTERIA: CPT

## 2020-01-01 PROCEDURE — 87086 URINE CULTURE/COLONY COUNT: CPT

## 2020-01-01 PROCEDURE — 76937 US GUIDE VASCULAR ACCESS: CPT

## 2020-01-01 PROCEDURE — 74011000258 HC RX REV CODE- 258: Performed by: NURSE PRACTITIONER

## 2020-01-01 PROCEDURE — 87340 HEPATITIS B SURFACE AG IA: CPT

## 2020-01-01 PROCEDURE — 83690 ASSAY OF LIPASE: CPT

## 2020-01-01 PROCEDURE — 84540 ASSAY OF URINE/UREA-N: CPT

## 2020-01-01 PROCEDURE — 80202 ASSAY OF VANCOMYCIN: CPT

## 2020-01-01 PROCEDURE — 77030012879 HC MSK CPAP FLL FAC PHIL -B

## 2020-01-01 PROCEDURE — 94640 AIRWAY INHALATION TREATMENT: CPT

## 2020-01-01 PROCEDURE — 93005 ELECTROCARDIOGRAM TRACING: CPT

## 2020-01-01 PROCEDURE — 73130 X-RAY EXAM OF HAND: CPT

## 2020-01-01 PROCEDURE — 83735 ASSAY OF MAGNESIUM: CPT

## 2020-01-01 PROCEDURE — 99223 1ST HOSP IP/OBS HIGH 75: CPT | Performed by: INTERNAL MEDICINE

## 2020-01-01 PROCEDURE — 74011636637 HC RX REV CODE- 636/637: Performed by: FAMILY MEDICINE

## 2020-01-01 PROCEDURE — 03HY32Z INSERTION OF MONITORING DEVICE INTO UPPER ARTERY, PERCUTANEOUS APPROACH: ICD-10-PCS | Performed by: NURSE PRACTITIONER

## 2020-01-01 PROCEDURE — 93306 TTE W/DOPPLER COMPLETE: CPT

## 2020-01-01 PROCEDURE — C1769 GUIDE WIRE: HCPCS

## 2020-01-01 PROCEDURE — 84300 ASSAY OF URINE SODIUM: CPT

## 2020-01-01 PROCEDURE — 70450 CT HEAD/BRAIN W/O DYE: CPT

## 2020-01-01 PROCEDURE — 96376 TX/PRO/DX INJ SAME DRUG ADON: CPT

## 2020-01-01 PROCEDURE — 87147 CULTURE TYPE IMMUNOLOGIC: CPT

## 2020-01-01 PROCEDURE — 81001 URINALYSIS AUTO W/SCOPE: CPT

## 2020-01-01 PROCEDURE — 36415 COLL VENOUS BLD VENIPUNCTURE: CPT

## 2020-01-01 PROCEDURE — 83615 LACTATE (LD) (LDH) ENZYME: CPT

## 2020-01-01 PROCEDURE — 83036 HEMOGLOBIN GLYCOSYLATED A1C: CPT

## 2020-01-01 PROCEDURE — 86900 BLOOD TYPING SEROLOGIC ABO: CPT

## 2020-01-01 PROCEDURE — 86706 HEP B SURFACE ANTIBODY: CPT

## 2020-01-01 PROCEDURE — 99233 SBSQ HOSP IP/OBS HIGH 50: CPT | Performed by: INTERNAL MEDICINE

## 2020-01-01 PROCEDURE — 87077 CULTURE AEROBIC IDENTIFY: CPT

## 2020-01-01 PROCEDURE — 85610 PROTHROMBIN TIME: CPT

## 2020-01-01 PROCEDURE — 87635 SARS-COV-2 COVID-19 AMP PRB: CPT

## 2020-01-01 PROCEDURE — 82570 ASSAY OF URINE CREATININE: CPT

## 2020-01-01 PROCEDURE — 86140 C-REACTIVE PROTEIN: CPT

## 2020-01-01 PROCEDURE — P9047 ALBUMIN (HUMAN), 25%, 50ML: HCPCS | Performed by: NURSE PRACTITIONER

## 2020-01-01 PROCEDURE — P9045 ALBUMIN (HUMAN), 5%, 250 ML: HCPCS | Performed by: INTERNAL MEDICINE

## 2020-01-01 RX ORDER — BISACODYL 5 MG
5 TABLET, DELAYED RELEASE (ENTERIC COATED) ORAL DAILY PRN
COMMUNITY

## 2020-01-01 RX ORDER — BISMUTH SUBSALICYLATE 262 MG
1 TABLET,CHEWABLE ORAL DAILY
COMMUNITY

## 2020-01-01 RX ORDER — GLYCOPYRROLATE 0.2 MG/ML
0.2 INJECTION INTRAMUSCULAR; INTRAVENOUS
Status: DISCONTINUED | OUTPATIENT
Start: 2020-01-01 | End: 2020-01-01 | Stop reason: HOSPADM

## 2020-01-01 RX ORDER — NOREPINEPHRINE BITARTRATE/D5W 8 MG/250ML
.5-3 PLASTIC BAG, INJECTION (ML) INTRAVENOUS
Status: DISCONTINUED | OUTPATIENT
Start: 2020-01-01 | End: 2020-01-01 | Stop reason: CLARIF

## 2020-01-01 RX ORDER — MAGNESIUM SULFATE 100 %
4 CRYSTALS MISCELLANEOUS AS NEEDED
Status: DISCONTINUED | OUTPATIENT
Start: 2020-01-01 | End: 2020-01-01 | Stop reason: HOSPADM

## 2020-01-01 RX ORDER — CAPSAICIN 0.1 %
CREAM (GRAM) TOPICAL 3 TIMES DAILY
COMMUNITY

## 2020-01-01 RX ORDER — ONDANSETRON 2 MG/ML
4 INJECTION INTRAMUSCULAR; INTRAVENOUS
Status: DISCONTINUED | OUTPATIENT
Start: 2020-01-01 | End: 2020-01-01 | Stop reason: HOSPADM

## 2020-01-01 RX ORDER — TAMSULOSIN HYDROCHLORIDE 0.4 MG/1
0.4 CAPSULE ORAL DAILY
Status: DISCONTINUED | OUTPATIENT
Start: 2020-01-01 | End: 2020-01-01 | Stop reason: HOSPADM

## 2020-01-01 RX ORDER — NOREPINEPHRINE BITARTRATE/D5W 8 MG/250ML
.5-16 PLASTIC BAG, INJECTION (ML) INTRAVENOUS
Status: DISCONTINUED | OUTPATIENT
Start: 2020-01-01 | End: 2020-01-01 | Stop reason: HOSPADM

## 2020-01-01 RX ORDER — DOCUSATE SODIUM 50 MG/5ML
100 LIQUID ORAL 2 TIMES DAILY
Status: DISCONTINUED | OUTPATIENT
Start: 2020-01-01 | End: 2020-01-01

## 2020-01-01 RX ORDER — ALBUTEROL SULFATE 90 UG/1
2 AEROSOL, METERED RESPIRATORY (INHALATION) ONCE
Status: COMPLETED | OUTPATIENT
Start: 2020-01-01 | End: 2020-01-01

## 2020-01-01 RX ORDER — POLYETHYLENE GLYCOL 3350 17 G/17G
17 POWDER, FOR SOLUTION ORAL DAILY PRN
Status: DISCONTINUED | OUTPATIENT
Start: 2020-01-01 | End: 2020-01-01 | Stop reason: HOSPADM

## 2020-01-01 RX ORDER — NYSTATIN 100000 [USP'U]/G
POWDER TOPICAL 4 TIMES DAILY
COMMUNITY

## 2020-01-01 RX ORDER — METOPROLOL TARTRATE 25 MG/1
TABLET, FILM COATED ORAL 2 TIMES DAILY
COMMUNITY

## 2020-01-01 RX ORDER — NOREPINEPHRINE BITARTRATE/D5W 8 MG/250ML
.5-3 PLASTIC BAG, INJECTION (ML) INTRAVENOUS
Status: DISCONTINUED | OUTPATIENT
Start: 2020-01-01 | End: 2020-01-01 | Stop reason: SDUPTHER

## 2020-01-01 RX ORDER — ROSUVASTATIN CALCIUM 20 MG/1
20 TABLET, COATED ORAL
COMMUNITY

## 2020-01-01 RX ORDER — PAROXETINE HYDROCHLORIDE 20 MG/1
TABLET, FILM COATED ORAL DAILY
COMMUNITY

## 2020-01-01 RX ORDER — PAROXETINE HYDROCHLORIDE 20 MG/1
10 TABLET, FILM COATED ORAL DAILY
Status: DISCONTINUED | OUTPATIENT
Start: 2020-01-01 | End: 2020-01-01 | Stop reason: HOSPADM

## 2020-01-01 RX ORDER — SENNOSIDES 8.8 MG/5ML
5 LIQUID ORAL EVERY EVENING
Status: DISCONTINUED | OUTPATIENT
Start: 2020-01-01 | End: 2020-01-01

## 2020-01-01 RX ORDER — NOREPINEPHRINE BITARTRATE/D5W 8 MG/250ML
PLASTIC BAG, INJECTION (ML) INTRAVENOUS
Status: COMPLETED
Start: 2020-01-01 | End: 2020-01-01

## 2020-01-01 RX ORDER — MONTELUKAST SODIUM 10 MG/1
10 TABLET ORAL DAILY
COMMUNITY

## 2020-01-01 RX ORDER — PHENTOLAMINE MESYLATE 5 MG/1
10 INJECTION INTRAMUSCULAR; INTRAVENOUS
Status: COMPLETED | OUTPATIENT
Start: 2020-01-01 | End: 2020-01-01

## 2020-01-01 RX ORDER — FUROSEMIDE 10 MG/ML
80 INJECTION INTRAMUSCULAR; INTRAVENOUS ONCE
Status: COMPLETED | OUTPATIENT
Start: 2020-01-01 | End: 2020-01-01

## 2020-01-01 RX ORDER — PANTOPRAZOLE SODIUM 40 MG/1
40 TABLET, DELAYED RELEASE ORAL DAILY
Status: DISCONTINUED | OUTPATIENT
Start: 2020-01-01 | End: 2020-01-01 | Stop reason: HOSPADM

## 2020-01-01 RX ORDER — IPRATROPIUM BROMIDE AND ALBUTEROL SULFATE 2.5; .5 MG/3ML; MG/3ML
3 SOLUTION RESPIRATORY (INHALATION)
Status: DISCONTINUED | OUTPATIENT
Start: 2020-01-01 | End: 2020-01-01

## 2020-01-01 RX ORDER — INSULIN LISPRO 100 [IU]/ML
INJECTION, SOLUTION INTRAVENOUS; SUBCUTANEOUS
Status: DISCONTINUED | OUTPATIENT
Start: 2020-01-01 | End: 2020-01-01 | Stop reason: HOSPADM

## 2020-01-01 RX ORDER — ACETAMINOPHEN 650 MG/1
650 SUPPOSITORY RECTAL
Status: DISCONTINUED | OUTPATIENT
Start: 2020-01-01 | End: 2020-01-01 | Stop reason: HOSPADM

## 2020-01-01 RX ORDER — MORPHINE SULFATE 2 MG/ML
2 INJECTION, SOLUTION INTRAMUSCULAR; INTRAVENOUS
Status: DISCONTINUED | OUTPATIENT
Start: 2020-01-01 | End: 2020-01-01

## 2020-01-01 RX ORDER — ALBUMIN HUMAN 50 G/1000ML
25 SOLUTION INTRAVENOUS ONCE
Status: COMPLETED | OUTPATIENT
Start: 2020-01-01 | End: 2020-01-01

## 2020-01-01 RX ORDER — DIGOXIN 0.25 MG/ML
INJECTION INTRAMUSCULAR; INTRAVENOUS
Status: COMPLETED
Start: 2020-01-01 | End: 2020-01-01

## 2020-01-01 RX ORDER — ACETAMINOPHEN 325 MG/1
650 TABLET ORAL
Status: DISCONTINUED | OUTPATIENT
Start: 2020-01-01 | End: 2020-01-01 | Stop reason: HOSPADM

## 2020-01-01 RX ORDER — SODIUM BICARBONATE 84 MG/ML
100 INJECTION, SOLUTION INTRAVENOUS
Status: COMPLETED | OUTPATIENT
Start: 2020-01-01 | End: 2020-01-01

## 2020-01-01 RX ORDER — PANTOPRAZOLE SODIUM 40 MG/1
40 TABLET, DELAYED RELEASE ORAL DAILY
COMMUNITY

## 2020-01-01 RX ORDER — MAGNESIUM SULFATE HEPTAHYDRATE 40 MG/ML
2 INJECTION, SOLUTION INTRAVENOUS ONCE
Status: COMPLETED | OUTPATIENT
Start: 2020-01-01 | End: 2020-01-01

## 2020-01-01 RX ORDER — SODIUM CHLORIDE 0.9 % (FLUSH) 0.9 %
5-40 SYRINGE (ML) INJECTION AS NEEDED
Status: DISCONTINUED | OUTPATIENT
Start: 2020-01-01 | End: 2020-01-01

## 2020-01-01 RX ORDER — ALBUMIN HUMAN 250 G/1000ML
25 SOLUTION INTRAVENOUS ONCE
Status: DISCONTINUED | OUTPATIENT
Start: 2020-01-01 | End: 2020-01-01

## 2020-01-01 RX ORDER — HEPARIN SODIUM 5000 [USP'U]/ML
5000 INJECTION, SOLUTION INTRAVENOUS; SUBCUTANEOUS EVERY 8 HOURS
Status: DISCONTINUED | OUTPATIENT
Start: 2020-01-01 | End: 2020-01-01 | Stop reason: HOSPADM

## 2020-01-01 RX ORDER — ALENDRONATE SODIUM 70 MG/1
TABLET ORAL
COMMUNITY

## 2020-01-01 RX ORDER — ACETAMINOPHEN 650 MG/1
650 SUPPOSITORY RECTAL
Status: DISCONTINUED | OUTPATIENT
Start: 2020-01-01 | End: 2020-01-01

## 2020-01-01 RX ORDER — FLUORIDE TOOTHPASTE
15 TOOTHPASTE DENTAL AS NEEDED
COMMUNITY

## 2020-01-01 RX ORDER — SODIUM BICARBONATE 1 MEQ/ML
50 SYRINGE (ML) INTRAVENOUS ONCE
Status: COMPLETED | OUTPATIENT
Start: 2020-01-01 | End: 2020-01-01

## 2020-01-01 RX ORDER — ONDANSETRON 4 MG/1
4 TABLET, ORALLY DISINTEGRATING ORAL
Status: DISCONTINUED | OUTPATIENT
Start: 2020-01-01 | End: 2020-01-01 | Stop reason: HOSPADM

## 2020-01-01 RX ORDER — GLUCOSAMINE SULFATE 1500 MG
POWDER IN PACKET (EA) ORAL DAILY
COMMUNITY

## 2020-01-01 RX ORDER — DEXTROSE 50 % IN WATER (D50W) INTRAVENOUS SYRINGE
25-50 AS NEEDED
Status: DISCONTINUED | OUTPATIENT
Start: 2020-01-01 | End: 2020-01-01 | Stop reason: HOSPADM

## 2020-01-01 RX ORDER — OXYCODONE AND ACETAMINOPHEN 5; 325 MG/1; MG/1
TABLET ORAL
COMMUNITY

## 2020-01-01 RX ORDER — SODIUM CHLORIDE, SODIUM LACTATE, POTASSIUM CHLORIDE, CALCIUM CHLORIDE 600; 310; 30; 20 MG/100ML; MG/100ML; MG/100ML; MG/100ML
125 INJECTION, SOLUTION INTRAVENOUS CONTINUOUS
Status: DISCONTINUED | OUTPATIENT
Start: 2020-01-01 | End: 2020-01-01

## 2020-01-01 RX ORDER — FLUTICASONE PROPIONATE AND SALMETEROL 250; 50 UG/1; UG/1
1 POWDER RESPIRATORY (INHALATION) EVERY 12 HOURS
COMMUNITY

## 2020-01-01 RX ORDER — TAMSULOSIN HYDROCHLORIDE 0.4 MG/1
0.4 CAPSULE ORAL DAILY
COMMUNITY

## 2020-01-01 RX ORDER — GABAPENTIN 300 MG/1
300 CAPSULE ORAL 3 TIMES DAILY
COMMUNITY

## 2020-01-01 RX ORDER — ALBUMIN HUMAN 250 G/1000ML
25 SOLUTION INTRAVENOUS ONCE
Status: COMPLETED | OUTPATIENT
Start: 2020-01-01 | End: 2020-01-01

## 2020-01-01 RX ORDER — SODIUM CHLORIDE 0.9 % (FLUSH) 0.9 %
5-40 SYRINGE (ML) INJECTION EVERY 8 HOURS
Status: DISCONTINUED | OUTPATIENT
Start: 2020-01-01 | End: 2020-01-01

## 2020-01-01 RX ORDER — DIGOXIN 0.25 MG/ML
250 INJECTION INTRAMUSCULAR; INTRAVENOUS ONCE
Status: COMPLETED | OUTPATIENT
Start: 2020-01-01 | End: 2020-01-01

## 2020-01-01 RX ORDER — SODIUM CHLORIDE 9 MG/ML
75 INJECTION, SOLUTION INTRAVENOUS CONTINUOUS
Status: DISCONTINUED | OUTPATIENT
Start: 2020-01-01 | End: 2020-01-01

## 2020-01-01 RX ORDER — ALLOPURINOL 100 MG/1
TABLET ORAL DAILY
COMMUNITY

## 2020-01-01 RX ORDER — TIZANIDINE 2 MG/1
TABLET ORAL
COMMUNITY

## 2020-01-01 RX ORDER — NOREPINEPHRINE BITARTRATE/D5W 8 MG/250ML
.5-3 PLASTIC BAG, INJECTION (ML) INTRAVENOUS
Status: DISCONTINUED | OUTPATIENT
Start: 2020-01-01 | End: 2020-01-01 | Stop reason: ALTCHOICE

## 2020-01-01 RX ORDER — ACETAMINOPHEN 325 MG/1
650 TABLET ORAL
Status: DISCONTINUED | OUTPATIENT
Start: 2020-01-01 | End: 2020-01-01

## 2020-01-01 RX ORDER — FOLIC ACID 1 MG/1
TABLET ORAL DAILY
COMMUNITY

## 2020-01-01 RX ORDER — ALBUTEROL SULFATE 0.83 MG/ML
SOLUTION RESPIRATORY (INHALATION)
COMMUNITY

## 2020-01-01 RX ORDER — LORAZEPAM 2 MG/ML
1 INJECTION INTRAMUSCULAR
Status: DISCONTINUED | OUTPATIENT
Start: 2020-01-01 | End: 2020-01-01 | Stop reason: HOSPADM

## 2020-01-01 RX ORDER — MORPHINE SULFATE 2 MG/ML
2 INJECTION, SOLUTION INTRAMUSCULAR; INTRAVENOUS
Status: DISCONTINUED | OUTPATIENT
Start: 2020-01-01 | End: 2020-01-01 | Stop reason: HOSPADM

## 2020-01-01 RX ORDER — SODIUM CHLORIDE 0.9 % (FLUSH) 0.9 %
5-10 SYRINGE (ML) INJECTION AS NEEDED
Status: DISCONTINUED | OUTPATIENT
Start: 2020-01-01 | End: 2020-01-01 | Stop reason: HOSPADM

## 2020-01-01 RX ADMIN — CALCIUM CHLORIDE, MAGNESIUM CHLORIDE, DEXTROSE MONOHYDRATE, LACTIC ACID, SODIUM CHLORIDE, SODIUM BICARBONATE AND POTASSIUM CHLORIDE 2700 ML/HR: 5.15; 2.03; 22; 5.4; 6.46; 3.09; .157 INJECTION INTRAVENOUS at 02:11

## 2020-01-01 RX ADMIN — LORAZEPAM 1 MG: 2 INJECTION INTRAMUSCULAR; INTRAVENOUS at 12:24

## 2020-01-01 RX ADMIN — SODIUM CHLORIDE 100 MCG/MIN: 9 INJECTION, SOLUTION INTRAVENOUS at 06:49

## 2020-01-01 RX ADMIN — NOREPINEPHRINE BITARTRATE 30 MCG/MIN: 1 INJECTION INTRAVENOUS at 10:51

## 2020-01-01 RX ADMIN — MAGNESIUM SULFATE HEPTAHYDRATE 2 G: 40 INJECTION, SOLUTION INTRAVENOUS at 05:04

## 2020-01-01 RX ADMIN — Medication 8 MCG/MIN: at 09:32

## 2020-01-01 RX ADMIN — CEFEPIME HYDROCHLORIDE 2 G: 2 INJECTION, POWDER, FOR SOLUTION INTRAVENOUS at 02:06

## 2020-01-01 RX ADMIN — CALCIUM CHLORIDE, MAGNESIUM CHLORIDE, DEXTROSE MONOHYDRATE, LACTIC ACID, SODIUM CHLORIDE, SODIUM BICARBONATE AND POTASSIUM CHLORIDE 2700 ML/HR: 5.15; 2.03; 22; 5.4; 6.46; 3.09; .157 INJECTION INTRAVENOUS at 22:30

## 2020-01-01 RX ADMIN — Medication 16 MCG/MIN: at 20:01

## 2020-01-01 RX ADMIN — SODIUM CHLORIDE 721 ML: 9 INJECTION, SOLUTION INTRAVENOUS at 15:21

## 2020-01-01 RX ADMIN — SODIUM BICARBONATE 50 MEQ: 84 INJECTION, SOLUTION INTRAVENOUS at 15:23

## 2020-01-01 RX ADMIN — SODIUM CHLORIDE 75 ML/HR: 9 INJECTION, SOLUTION INTRAVENOUS at 19:02

## 2020-01-01 RX ADMIN — Medication 16 MCG/MIN: at 00:57

## 2020-01-01 RX ADMIN — ALBUTEROL SULFATE 2 PUFF: 108 AEROSOL, METERED RESPIRATORY (INHALATION) at 15:22

## 2020-01-01 RX ADMIN — SODIUM CHLORIDE, POTASSIUM CHLORIDE, SODIUM LACTATE AND CALCIUM CHLORIDE 125 ML/HR: 600; 310; 30; 20 INJECTION, SOLUTION INTRAVENOUS at 10:39

## 2020-01-01 RX ADMIN — CALCIUM CHLORIDE, MAGNESIUM CHLORIDE, DEXTROSE MONOHYDRATE, LACTIC ACID, SODIUM CHLORIDE, SODIUM BICARBONATE AND POTASSIUM CHLORIDE 2700 ML/HR: 5.15; 2.03; 22; 5.4; 6.46; 3.09; .157 INJECTION INTRAVENOUS at 11:25

## 2020-01-01 RX ADMIN — PIPERACILLIN AND TAZOBACTAM 3.38 G: 3; .375 INJECTION, POWDER, LYOPHILIZED, FOR SOLUTION INTRAVENOUS at 09:33

## 2020-01-01 RX ADMIN — Medication 10 ML: at 02:23

## 2020-01-01 RX ADMIN — PIPERACILLIN AND TAZOBACTAM 3.38 G: 3; .375 INJECTION, POWDER, LYOPHILIZED, FOR SOLUTION INTRAVENOUS at 17:22

## 2020-01-01 RX ADMIN — Medication 10 ML: at 22:08

## 2020-01-01 RX ADMIN — INSULIN LISPRO 7 UNITS: 100 INJECTION, SOLUTION INTRAVENOUS; SUBCUTANEOUS at 22:41

## 2020-01-01 RX ADMIN — CEFEPIME HYDROCHLORIDE 2 G: 2 INJECTION, POWDER, FOR SOLUTION INTRAVENOUS at 02:29

## 2020-01-01 RX ADMIN — FAMOTIDINE 20 MG: 10 INJECTION INTRAVENOUS at 02:29

## 2020-01-01 RX ADMIN — CALCIUM CHLORIDE, MAGNESIUM CHLORIDE, DEXTROSE MONOHYDRATE, LACTIC ACID, SODIUM CHLORIDE, SODIUM BICARBONATE AND POTASSIUM CHLORIDE 2000 ML/HR: 3.68; 3.05; 22; 5.4; 6.46; 3.09; .314 INJECTION INTRAVENOUS at 02:26

## 2020-01-01 RX ADMIN — EPINEPHRINE 10 MCG/MIN: 1 INJECTION INTRAMUSCULAR; INTRAVENOUS; SUBCUTANEOUS at 10:51

## 2020-01-01 RX ADMIN — CALCIUM CHLORIDE, MAGNESIUM CHLORIDE, DEXTROSE MONOHYDRATE, LACTIC ACID, SODIUM CHLORIDE, SODIUM BICARBONATE AND POTASSIUM CHLORIDE 2700 ML/HR: 5.15; 2.03; 22; 5.4; 6.46; 3.09; .157 INJECTION INTRAVENOUS at 21:25

## 2020-01-01 RX ADMIN — ALBUMIN (HUMAN) 25 G: 12.5 INJECTION, SOLUTION INTRAVENOUS at 17:00

## 2020-01-01 RX ADMIN — HEPARIN SODIUM 5000 UNITS: 5000 INJECTION INTRAVENOUS; SUBCUTANEOUS at 17:22

## 2020-01-01 RX ADMIN — FUROSEMIDE 80 MG: 10 INJECTION, SOLUTION INTRAMUSCULAR; INTRAVENOUS at 14:27

## 2020-01-01 RX ADMIN — SODIUM CHLORIDE 100 MCG/MIN: 9 INJECTION, SOLUTION INTRAVENOUS at 18:27

## 2020-01-01 RX ADMIN — HEPARIN SODIUM 5000 UNITS: 5000 INJECTION INTRAVENOUS; SUBCUTANEOUS at 06:14

## 2020-01-01 RX ADMIN — CALCIUM CHLORIDE, MAGNESIUM CHLORIDE, DEXTROSE MONOHYDRATE, LACTIC ACID, SODIUM CHLORIDE, SODIUM BICARBONATE AND POTASSIUM CHLORIDE 2700 ML/HR: 5.15; 2.03; 22; 5.4; 6.46; 3.09; .157 INJECTION INTRAVENOUS at 14:16

## 2020-01-01 RX ADMIN — METHYLPREDNISOLONE SODIUM SUCCINATE 40 MG: 40 INJECTION, POWDER, FOR SOLUTION INTRAMUSCULAR; INTRAVENOUS at 12:00

## 2020-01-01 RX ADMIN — VASOPRESSIN 0.03 UNITS/MIN: 20 INJECTION INTRAVENOUS at 15:35

## 2020-01-01 RX ADMIN — VASOPRESSIN 0.01 UNITS/MIN: 20 INJECTION INTRAVENOUS at 06:36

## 2020-01-01 RX ADMIN — CALCIUM CHLORIDE, MAGNESIUM CHLORIDE, DEXTROSE MONOHYDRATE, LACTIC ACID, SODIUM CHLORIDE, SODIUM BICARBONATE AND POTASSIUM CHLORIDE 2700 ML/HR: 5.15; 2.03; 22; 5.4; 6.46; 3.09; .157 INJECTION INTRAVENOUS at 09:00

## 2020-01-01 RX ADMIN — CALCIUM CHLORIDE, MAGNESIUM CHLORIDE, DEXTROSE MONOHYDRATE, LACTIC ACID, SODIUM CHLORIDE, SODIUM BICARBONATE AND POTASSIUM CHLORIDE 1000 ML/HR: 3.68; 3.05; 22; 5.4; 6.46; 3.09; .314 INJECTION INTRAVENOUS at 07:34

## 2020-01-01 RX ADMIN — VANCOMYCIN HYDROCHLORIDE 1000 MG: 1 INJECTION, POWDER, LYOPHILIZED, FOR SOLUTION INTRAVENOUS at 17:00

## 2020-01-01 RX ADMIN — SODIUM CHLORIDE 50 MCG/MIN: 9 INJECTION, SOLUTION INTRAVENOUS at 00:58

## 2020-01-01 RX ADMIN — CALCIUM CHLORIDE, MAGNESIUM CHLORIDE, DEXTROSE MONOHYDRATE, LACTIC ACID, SODIUM CHLORIDE, SODIUM BICARBONATE AND POTASSIUM CHLORIDE 1000 ML/HR: 3.68; 3.05; 22; 5.4; 6.46; 3.09; .314 INJECTION INTRAVENOUS at 07:31

## 2020-01-01 RX ADMIN — CALCIUM CHLORIDE, MAGNESIUM CHLORIDE, DEXTROSE MONOHYDRATE, LACTIC ACID, SODIUM CHLORIDE, SODIUM BICARBONATE AND POTASSIUM CHLORIDE 2700 ML/HR: 5.15; 2.03; 22; 5.4; 6.46; 3.09; .157 INJECTION INTRAVENOUS at 22:59

## 2020-01-01 RX ADMIN — CALCIUM CHLORIDE, MAGNESIUM CHLORIDE, DEXTROSE MONOHYDRATE, LACTIC ACID, SODIUM CHLORIDE, SODIUM BICARBONATE AND POTASSIUM CHLORIDE 2000 ML/HR: 3.68; 3.05; 22; 5.4; 6.46; 3.09; .314 INJECTION INTRAVENOUS at 02:25

## 2020-01-01 RX ADMIN — PIPERACILLIN AND TAZOBACTAM 3.38 G: 3; .375 INJECTION, POWDER, LYOPHILIZED, FOR SOLUTION INTRAVENOUS at 15:33

## 2020-01-01 RX ADMIN — INSULIN LISPRO 3 UNITS: 100 INJECTION, SOLUTION INTRAVENOUS; SUBCUTANEOUS at 09:34

## 2020-01-01 RX ADMIN — SODIUM CHLORIDE 1000 ML: 9 INJECTION, SOLUTION INTRAVENOUS at 13:07

## 2020-01-01 RX ADMIN — SODIUM CHLORIDE 75 ML/HR: 9 INJECTION, SOLUTION INTRAVENOUS at 09:33

## 2020-01-01 RX ADMIN — SODIUM CHLORIDE 75 ML/HR: 9 INJECTION, SOLUTION INTRAVENOUS at 16:21

## 2020-01-01 RX ADMIN — DIGOXIN 250 MCG: 0.25 INJECTION INTRAMUSCULAR; INTRAVENOUS at 22:33

## 2020-01-01 RX ADMIN — METHYLPREDNISOLONE SODIUM SUCCINATE 40 MG: 40 INJECTION, POWDER, FOR SOLUTION INTRAMUSCULAR; INTRAVENOUS at 17:22

## 2020-01-01 RX ADMIN — VASOPRESSIN 0.04 UNITS/MIN: 20 INJECTION INTRAVENOUS at 01:02

## 2020-01-01 RX ADMIN — NOREPINEPHRINE BITARTRATE 24 MCG/MIN: 1 INJECTION INTRAVENOUS at 07:12

## 2020-01-01 RX ADMIN — HEPARIN SODIUM 5000 UNITS: 5000 INJECTION INTRAVENOUS; SUBCUTANEOUS at 18:05

## 2020-01-01 RX ADMIN — ALBUMIN (HUMAN) 25 G: 12.5 INJECTION, SOLUTION INTRAVENOUS at 05:48

## 2020-01-01 RX ADMIN — ALBUMIN (HUMAN) 25 G: 12.5 INJECTION, SOLUTION INTRAVENOUS at 11:44

## 2020-01-01 RX ADMIN — DIGOXIN 250 MCG: 250 INJECTION, SOLUTION INTRAMUSCULAR; INTRAVENOUS; PARENTERAL at 22:33

## 2020-01-01 RX ADMIN — CALCIUM CHLORIDE, MAGNESIUM CHLORIDE, DEXTROSE MONOHYDRATE, LACTIC ACID, SODIUM CHLORIDE, SODIUM BICARBONATE AND POTASSIUM CHLORIDE 2000 ML/HR: 3.68; 3.05; 22; 5.4; 6.46; 3.09; .314 INJECTION INTRAVENOUS at 02:24

## 2020-01-01 RX ADMIN — CALCIUM CHLORIDE, MAGNESIUM CHLORIDE, DEXTROSE MONOHYDRATE, LACTIC ACID, SODIUM CHLORIDE, SODIUM BICARBONATE AND POTASSIUM CHLORIDE 2700 ML/HR: 5.15; 2.03; 22; 5.4; 6.46; 3.09; .157 INJECTION INTRAVENOUS at 16:36

## 2020-01-01 RX ADMIN — NOREPINEPHRINE BITARTRATE 30 MCG/MIN: 1 INJECTION INTRAVENOUS at 22:59

## 2020-01-01 RX ADMIN — SODIUM CHLORIDE 100 MCG/MIN: 9 INJECTION, SOLUTION INTRAVENOUS at 12:09

## 2020-01-01 RX ADMIN — Medication 10 ML: at 05:02

## 2020-01-01 RX ADMIN — CALCIUM GLUCONATE 2 G: 98 INJECTION, SOLUTION INTRAVENOUS at 05:51

## 2020-01-01 RX ADMIN — CALCIUM CHLORIDE, MAGNESIUM CHLORIDE, DEXTROSE MONOHYDRATE, LACTIC ACID, SODIUM CHLORIDE, SODIUM BICARBONATE AND POTASSIUM CHLORIDE 2700 ML/HR: 5.15; 2.03; 22; 5.4; 6.46; 3.09; .157 INJECTION INTRAVENOUS at 12:11

## 2020-01-01 RX ADMIN — EPINEPHRINE 1 MCG/MIN: 1 INJECTION INTRAMUSCULAR; INTRAVENOUS; SUBCUTANEOUS at 02:42

## 2020-01-01 RX ADMIN — FAMOTIDINE 20 MG: 10 INJECTION INTRAVENOUS at 02:06

## 2020-01-01 RX ADMIN — VANCOMYCIN HYDROCHLORIDE 1000 MG: 1 INJECTION, POWDER, LYOPHILIZED, FOR SOLUTION INTRAVENOUS at 18:04

## 2020-01-01 RX ADMIN — ALBUMIN (HUMAN) 25 G: 0.25 INJECTION, SOLUTION INTRAVENOUS at 02:07

## 2020-01-01 RX ADMIN — CALCIUM CHLORIDE, MAGNESIUM CHLORIDE, DEXTROSE MONOHYDRATE, LACTIC ACID, SODIUM CHLORIDE, SODIUM BICARBONATE AND POTASSIUM CHLORIDE 2000 ML/HR: 3.68; 3.05; 22; 5.4; 6.46; 3.09; .314 INJECTION INTRAVENOUS at 07:18

## 2020-01-01 RX ADMIN — MORPHINE SULFATE 2 MG: 2 INJECTION, SOLUTION INTRAMUSCULAR; INTRAVENOUS at 12:23

## 2020-01-01 RX ADMIN — Medication 10 ML: at 05:05

## 2020-01-01 RX ADMIN — SODIUM BICARBONATE 100 MEQ: 84 INJECTION, SOLUTION INTRAVENOUS at 02:29

## 2020-01-01 RX ADMIN — VASOPRESSIN 0.04 UNITS/MIN: 20 INJECTION INTRAVENOUS at 08:30

## 2020-01-01 RX ADMIN — CALCIUM CHLORIDE, MAGNESIUM CHLORIDE, DEXTROSE MONOHYDRATE, LACTIC ACID, SODIUM CHLORIDE, SODIUM BICARBONATE AND POTASSIUM CHLORIDE 1000 ML/HR: 3.68; 3.05; 22; 5.4; 6.46; 3.09; .314 INJECTION INTRAVENOUS at 07:30

## 2020-01-01 RX ADMIN — CEFTRIAXONE 1 G: 1 INJECTION, POWDER, FOR SOLUTION INTRAMUSCULAR; INTRAVENOUS at 14:59

## 2020-01-01 RX ADMIN — SODIUM CHLORIDE 1000 ML: 9 INJECTION, SOLUTION INTRAVENOUS at 14:25

## 2020-01-01 RX ADMIN — DOCUSATE SODIUM 100 MG: 50 LIQUID ORAL at 08:07

## 2020-01-01 RX ADMIN — DEXTROSE MONOHYDRATE 12 MCG/MIN: 50 INJECTION, SOLUTION INTRAVENOUS at 23:00

## 2020-01-01 RX ADMIN — VANCOMYCIN HYDROCHLORIDE 750 MG: 750 INJECTION, POWDER, LYOPHILIZED, FOR SOLUTION INTRAVENOUS at 17:22

## 2020-01-01 RX ADMIN — Medication 10 ML: at 14:25

## 2020-01-01 RX ADMIN — PHENTOLAMINE MESYLATE 10 MG: 5 INJECTION, POWDER, FOR SOLUTION INTRAMUSCULAR; INTRAVENOUS at 20:54

## 2020-01-01 RX ADMIN — ALBUMIN (HUMAN) 25 G: 12.5 INJECTION, SOLUTION INTRAVENOUS at 09:17

## 2020-01-01 RX ADMIN — Medication 10 MCG/MIN: at 12:09

## 2020-01-01 RX ADMIN — CEFEPIME HYDROCHLORIDE 2 G: 2 INJECTION, POWDER, FOR SOLUTION INTRAVENOUS at 14:08

## 2020-01-01 RX ADMIN — PIPERACILLIN AND TAZOBACTAM 3.38 G: 3; .375 INJECTION, POWDER, LYOPHILIZED, FOR SOLUTION INTRAVENOUS at 22:46

## 2020-01-01 RX ADMIN — HEPARIN SODIUM 5000 UNITS: 5000 INJECTION INTRAVENOUS; SUBCUTANEOUS at 22:02

## 2020-12-04 PROBLEM — R41.82 AMS (ALTERED MENTAL STATUS): Status: ACTIVE | Noted: 2020-01-01

## 2020-12-04 NOTE — ROUTINE PROCESS
TRANSFER - OUT REPORT:    Verbal report given to Eleni Fortune on Zac Nguyen  being [facility] for routine progression of care       Report consisted of patients Situation, Background, Assessment and   Recommendations(SBAR). Information from the following report(s) SBAR, ED Summary, Intake/Output, MAR and Recent Results was reviewed with the receiving nurse. Lines:   Peripheral IV 12/04/20 Right Antecubital (Active)   Site Assessment Clean, dry, & intact 12/04/20 1208   Phlebitis Assessment 0 12/04/20 1208   Infiltration Assessment 0 12/04/20 1208   Dressing Status Clean, dry, & intact 12/04/20 1208   Dressing Type Tape;Transparent 12/04/20 1208   Hub Color/Line Status Patent; Flushed 12/04/20 1208   Action Taken Blood drawn 12/04/20 1208       Peripheral IV 12/04/20 Left; Inner; Upper Arm (Active)   Site Assessment Clean, dry, & intact 12/04/20 1826   Phlebitis Assessment 0 12/04/20 1826   Infiltration Assessment 0 12/04/20 1826   Dressing Status Clean, dry, & intact 12/04/20 1826   Dressing Type Tape;Transparent 12/04/20 1826   Hub Color/Line Status Pink 12/04/20 1826       Peripheral IV 12/04/20 Left Arm (Active)   Site Assessment Clean, dry, & intact 12/04/20 1840   Phlebitis Assessment 0 12/04/20 1840   Infiltration Assessment 0 12/04/20 1840   Dressing Status Clean, dry, & intact 12/04/20 1840   Dressing Type Tape;Transparent 12/04/20 1840   Hub Color/Line Status Blue;Patent; Flushed 12/04/20 1840        Opportunity for questions and clarification was provided.       Patient transported with:   Registered Nurse

## 2020-12-04 NOTE — PROGRESS NOTES
Consult for Vancomycin Dosing by Pharmacy by Dr. Solitario Rodriguez provided for this 76y.o. year old female , for indication of UTI. Day of Therapy: 1  Goal of Level(s): 15-20mcg/dL    Other Current Antibiotics: Zosyn        Serum Creatinine Creatinine   Date Value Ref Range Status   12/04/2020 2.76 (H) 0.55 - 1.02 mg/dL Final      Creatinine Clearance Estimated Creatinine Clearance: 21 mL/min (A) (based on SCr of 2.76 mg/dL (H)). BUN Lab Results   Component Value Date/Time    BUN 45 (H) 12/04/2020 12:30 PM      WBC Lab Results   Component Value Date/Time    WBC 10.5 12/04/2020 12:30 PM      Temp 98.2 °F (36.8 °C)         Ideal body weight: 50.1 kg (110 lb 7.2 oz)  Adjusted ideal body weight: 66.3 kg (146 lb 4.3 oz)         New Regimen: Start Vancomycin 1000 mg IV today at 1700. Please do a random level on 12/5/2020 at 0600. Pharmacy to follow daily and will make changes to dose and/or frequency based on clinical status.     _________________________________     Pharmacist BESSIE MunizD

## 2020-12-04 NOTE — ED TRIAGE NOTES
Pt brought in from [facility]. Staff reported AMS. EMS BGL over 500.  Pt was brought in on 2l NC

## 2020-12-04 NOTE — CONSULTS
Consult Date: 12/4/2020    Consults:  Sepsis    Subjective      This is a 76year old female resident of [address] brought to the ED with mental status changes and hyperglycemia. She was afebrile but hypotensive, raising concern for septic shock. WBC and lactic acid normal. CXR showed reduced lung volumes with nonspecific coarse reticular markings through the lungs, possible fibrosis (images reviewed by me). CT Head unremarkable. Blood and urine cultures sent along with Covid-19 testing. Patient currently on IV Zosyn. ID has been consulted for this reason. Patient seen in the ED at which time she is confused and unable to provide any history. Daughter states that she complained of pain in her side but her mental status is altered. She states that she treated for UTI at [facility] recently. Past Medical History:   Diagnosis Date    Chronic obstructive pulmonary disease ([facility] [address]     Hypertension     Sarcoidosis       No past surgical history on file. No family history on file.    Social History     Tobacco Use    Smoking status: Never Smoker    Smokeless tobacco: Never Used   Substance Use Topics    Alcohol use: No       Current Facility-Administered Medications   Medication Dose Route Frequency Provider Last Rate Last Dose    sodium chloride (NS) flush 5-10 mL  5-10 mL IntraVENous PRN Tracey Carrillo MD [place] [START ON 12/5/2020] pantoprazole (PROTONIX) tablet 40 mg  40 mg Oral DAILY Tracey Carrillo MD       [place] [START ON 12/5/2020] PARoxetine (PAXIL) tablet 10 mg  10 mg Oral DAILY Tracey Carrillo MD       [place] [START ON 12/5/2020] tamsulosin (FLOMAX) capsule 0.4 mg  0.4 mg Oral DAILY Tracey Carrillo MD        0.9% sodium chloride infusion  75 mL/hr IntraVENous CONTINUOUS Tracey Carrillo MD        acetaminophen (TYLENOL) tablet 650 mg  650 mg Oral Q6H PRN Donald Carrillo MD        Or   [place] acetaminophen (TYLENOL) suppository 650 mg  650 mg Rectal Q6H PRN Donnie Moser MD        polyethylene glycol (MIRALAX) packet 17 g  17 g Oral DAILY PRN Mehrdad Carrillo MD        ondansetron (ZOFRAN ODT) tablet 4 mg  4 mg Oral Q8H PRN Mehrdad Carrillo MD        Or    ondansetron [facility]) injection 4 mg  4 mg IntraVENous Q6H PRN Mehrdad Carrillo MD        heparin (porcine) injection 5,000 Units  5,000 Units SubCUTAneous Q8H Mehrdad Carrillo MD        insulin lispro (HUMALOG) injection   SubCUTAneous AC&HS Mehrdad Carrillo MD        glucose chewable tablet 16 g  4 Tab Oral PRN Mehrdad Carrillo MD        dextrose (D50W) injection syrg 12.5-25 g  25-50 mL IntraVENous PRN Mehrdad Carrillo MD        glucagon (GLUCAGEN) injection 1 mg  1 mg IntraMUSCular PRN Mehrdad Carrillo MD        piperacillin-tazobactam (ZOSYN) 3.375 g in 0.9% sodium chloride (MBP/ADV) 100 mL MBP  3.375 g IntraVENous Q8H Donald Carrillo MD         Current Outpatient Medications   Medication Sig Dispense Refill    fluticasone propion-salmeteroL (Advair Diskus) 250-50 mcg/dose diskus inhaler Take 1 Puff by inhalation every twelve (12) hours.  albuterol (PROVENTIL VENTOLIN) 2.5 mg /3 mL (0.083 %) nebu by Nebulization route.  allopurinoL (ZYLOPRIM) 100 mg tablet Take  by mouth daily.  saliva substitution (Biotene Dry Mouth Oral Rinse) mwsh mouthwash 15 mL by Mucous Membrane route as needed.  bisacodyL (DULCOLAX) 5 mg EC tablet Take 5 mg by mouth daily as needed for Constipation.  capsaicin (CAPZASIN-HP) 0.1 % topical cream Apply  to affected area three (3) times daily.  cholecalciferol (VITAMIN D3) 25 mcg (1,000 unit) cap Take  by mouth daily.  ipratropium-albuteroL (Combivent Respimat)  mcg/actuation inhaler Take 1 Puff by inhalation every six (6) hours.  rosuvastatin (Crestor) 20 mg tablet Take 20 mg by mouth nightly.  folic acid (FOLVITE) 1 mg tablet Take  by mouth daily.  alendronate (Fosamax) 70 mg tablet Take  by mouth.       gabapentin (NEURONTIN) 300 mg capsule Take 300 mg by mouth three (3) times daily.  metoprolol tartrate (LOPRESSOR) 25 mg tablet Take  by mouth two (2) times a day.  multivitamin (ONE A DAY) tablet Take 1 Tab by mouth daily.  nystatin (MYCOSTATIN) powder Apply  to affected area four (4) times daily.  oxyCODONE-acetaminophen (PERCOCET) 5-325 mg per tablet Take  by mouth every four (4) hours as needed for Pain.  PARoxetine (PaxiL) 20 mg tablet Take  by mouth daily.  pantoprazole (Protonix) 40 mg tablet Take 40 mg by mouth daily.  montelukast (Singulair) 10 mg tablet Take 10 mg by mouth daily.  tamsulosin (FLOMAX) 0.4 mg capsule Take 0.4 mg by mouth daily.  tiZANidine (ZANAFLEX) 2 mg tablet Take  by mouth.  predniSONE (DELTASONE) 20 mg tablet Take  by mouth daily (with breakfast).  budesonide-formoterol (SYMBICORT) 160-4.5 mcg/actuation HFA inhaler Take 2 Puffs by inhalation two (2) times a day.  denosumab (PROLIA) 60 mg/mL injection 60 mg by SubCUTAneous route. Review of Systems   Unable to perform ROS: Mental status change       Objective     Vital signs for last 24 hours:  Visit Vitals  BP (!) 99/52   Pulse 96   Temp 98.2 °F (36.8 °C)   Resp 23   Ht 5' 2\" (1.575 m)   Wt 200 lb (90.7 kg)   SpO2 99%   BMI 36.58 kg/m²       Intake/Output this shift:  Current Shift: No intake/output data recorded. Last 3 Shifts: No intake/output data recorded.     Data Review:   Recent Results (from the past 24 hour(s))   CBC WITH AUTOMATED DIFF    Collection Time: 12/04/20 12:30 PM   Result Value Ref Range    WBC 10.5 3.6 - 11.0 K/uL    RBC 3.02 (L) 3.80 - 5.20 M/uL    HGB 9.2 (L) 11.5 - 16.0 g/dL    HCT 30.2 (L) 35.0 - 47.0 %    .0 (H) 80.0 - 99.0 FL    MCH 30.5 26.0 - 34.0 PG    MCHC 30.5 30.0 - 36.5 g/dL    RDW 20.1 (H) 11.5 - 14.5 %    PLATELET 414 481 - 597 K/uL    MPV 11.4 8.9 - 12.9 FL    NRBC 0.7 (H) 0  WBC    ABSOLUTE NRBC 0.07 (H) 0.00 - 0.01 K/uL    NEUTROPHILS 67 32 - 75 %    BAND NEUTROPHILS 5 0 - 6 %    LYMPHOCYTES 9 (L) 12 - 49 %    MONOCYTES 14 (H) 5 - 13 %    EOSINOPHILS 1 0 - 7 %    BASOPHILS 0 0 - 1 %    METAMYELOCYTES 1 (H) 0 %    MYELOCYTES 3 (H) 0 %    IMMATURE GRANULOCYTES 0 %    ABS. NEUTROPHILS 7.6 1.8 - 8.0 K/UL    ABS. LYMPHOCYTES 0.9 0.8 - 3.5 K/UL    ABS. MONOCYTES 1.5 (H) 0.0 - 1.0 K/UL    ABS. EOSINOPHILS 0.1 0.0 - 0.4 K/UL    ABS. BASOPHILS 0.0 0.0 - 0.1 K/UL    ABS. IMM. GRANS. 0.0 K/UL    DF Manual      RBC COMMENTS Microcytosis  1+       METABOLIC PANEL, COMPREHENSIVE    Collection Time: 12/04/20 12:30 PM   Result Value Ref Range    Sodium 137 136 - 145 mmol/L    Potassium Hemolyzed, recollect requested 3.5 - 5.1 mmol/L    Chloride 105 97 - 108 mmol/L    CO2 23 21 - 32 mmol/L    Anion gap 9 5 - 15 mmol/L    Glucose 405 (H) 65 - 100 mg/dL    BUN 45 (H) 6 - 20 mg/dL    Creatinine 2.76 (H) 0.55 - 1.02 mg/dL    BUN/Creatinine ratio 16 12 - 20      GFR est AA 21 (L) >60 ml/min/1.73m2    GFR est non-AA 17 (L) >60 ml/min/1.73m2    Calcium 8.3 (L) 8.5 - 10.1 mg/dL    Bilirubin, total 0.7 0.2 - 1.0 mg/dL    AST (SGOT) Hemolyzed, recollect requested 15 - 37 U/L    ALT (SGPT) 48 12 - 78 U/L    Alk.  phosphatase 154 (H) 45 - 117 U/L    Protein, total 5.5 (L) 6.4 - 8.2 g/dL    Albumin 1.8 (L) 3.5 - 5.0 g/dL    Globulin 3.7 2.0 - 4.0 g/dL    A-G Ratio 0.5 (L) 1.1 - 2.2     BNP    Collection Time: 12/04/20 12:30 PM   Result Value Ref Range    NT pro- (H) <125 pg/mL   TROPONIN I    Collection Time: 12/04/20 12:30 PM   Result Value Ref Range    Troponin-I, Qt. <0.05 <0.05 ng/mL   LACTIC ACID    Collection Time: 12/04/20 12:30 PM   Result Value Ref Range    Lactic acid 2.0 0.4 - 2.0 mmol/L   SARS-COV-2    Collection Time: 12/04/20  2:28 PM   Result Value Ref Range    SARS-CoV-2 Please find results under separate order     BLOOD GAS, ARTERIAL    Collection Time: 12/04/20  2:51 PM   Result Value Ref Range    pH 7.19 (LL) 7.35 - 7.45      PCO2 27 (L) 35 - 45 mmHg    PO2 20 (LL) 75 - 100 mmHg O2 SAT 27 (LL) >95 %    BICARBONATE 11 (L) 22 - 26 mmol/L    BASE DEFICIT 16.2 (H) 0 - 2 mmol/L    O2 FLOW RATE 4 L/min    FIO2 36 %    Critical value read back APRIL COST RN        Physical Exam  Vitals signs and nursing note reviewed.   Constitutional:       General: She is not in acute distress.     Appearance: She is obese. She is ill-appearing. She is not diaphoretic.   HENT:      Head: Normocephalic and atraumatic.      Nose: Nose normal.      Comments: Nasal O2 cannula     Mouth/Throat:      Pharynx: Oropharynx is clear.   Eyes:      Pupils: Pupils are equal, round, and reactive to light.   Neck:      Musculoskeletal: Neck supple.   Cardiovascular:      Rate and Rhythm: Normal rate and regular rhythm.      Heart sounds: No murmur.   Pulmonary:      Breath sounds: Rales (left lung field) present. No rhonchi.   Abdominal:      Palpations: Abdomen is soft.      Tenderness: There is no abdominal tenderness. There is no right CVA tenderness or left CVA tenderness.   Genitourinary:     Comments: Costa catheter  Musculoskeletal:         General: No tenderness.      Right lower leg: No edema.      Left lower leg: No edema.   Skin:     Findings: No rash.   Neurological:      General: No focal deficit present.      Mental Status: She is alert. She is disoriented.   Psychiatric:      Comments: Unable to assess       ASSESSMENT/PLAN:    1. Possible septic shock with unexplained hypotension, no obvious source of infection  2. Altered mental status, etiology unclear  3. Rule out Covid-19    1. Continue IV Zosyn  2. Start IV Vancomycin  3. Follow-up blood and urine cultures  4. In am, check CRP and procalcitonin  5. Follow-up Covid-19 results    Ernesto Martinez MD

## 2020-12-04 NOTE — H&P
History and Physical    NAME: Loraine Farah   :  1955   MRN:  [id number]     Date/Time:  2020 3:10 PM    Patient PCP: Natan Damon MD  ______________________________________________________________________             Subjective:     CHIEF COMPLAINT:   ams       HISTORY OF PRESENT ILLNESS:       Patient is a 76y.o. year old female history of COPD hypertension sarcoidosis came from the nursing home altered mental status history of questionable stroke came to the ER with altered mental status from the nursing home when came in patient was hypotensive seen by the ER physician given 2 L of IV fluids order blood cultures urine cultures and start on IV Rocephin I saw the patient patient open eyes say few words    Past Medical History:   Diagnosis Date    Chronic obstructive pulmonary disease ([address]     Hypertension     Sarcoidosis         No past surgical history on file. Social History     Tobacco Use    Smoking status: Never Smoker    Smokeless tobacco: Never Used   Substance Use Topics    Alcohol use: No        No family history on file. No Known Allergies     Prior to Admission medications    Medication Sig Start Date End Date Taking? Authorizing Provider   fluticasone propion-salmeteroL (Advair Diskus) 250-50 mcg/dose diskus inhaler Take 1 Puff by inhalation every twelve (12) hours. Yes Other, MD sEtelle   albuterol (PROVENTIL VENTOLIN) 2.5 mg /3 mL (0.083 %) nebu by Nebulization route. Yes Other, MD Estelle   allopurinoL (ZYLOPRIM) 100 mg tablet Take  by mouth daily. Yes Other, MD Estelle   saliva substitution (Biotene Dry Mouth Oral Rinse) mwsh mouthwash 15 mL by Mucous Membrane route as needed. Yes Other, MD Estelle   bisacodyL (DULCOLAX) 5 mg EC tablet Take 5 mg by mouth daily as needed for Constipation. Yes Other, MD Estelle   capsaicin (CAPZASIN-HP) 0.1 % topical cream Apply  to affected area three (3) times daily.    Yes Other, MD Estelle   cholecalciferol (VITAMIN D3) 25 mcg (1,000 unit) cap Take  by mouth daily. Yes Other, MD Estelle   ipratropium-albuteroL (Combivent Respimat)  mcg/actuation inhaler Take 1 Puff by inhalation every six (6) hours. Yes Other, MD Estelle   rosuvastatin (Crestor) 20 mg tablet Take 20 mg by mouth nightly. Yes Other, MD Estelle   folic acid (FOLVITE) 1 mg tablet Take  by mouth daily. Yes Other, MD Estelle   alendronate (Fosamax) 70 mg tablet Take  by mouth. Yes Other, MD Estelle   gabapentin (NEURONTIN) 300 mg capsule Take 300 mg by mouth three (3) times daily. Yes Other, MD Estelle   metoprolol tartrate (LOPRESSOR) 25 mg tablet Take  by mouth two (2) times a day. Yes Other, MD Estelle   multivitamin (ONE A DAY) tablet Take 1 Tab by mouth daily. Yes Other, MD Estelle   nystatin (MYCOSTATIN) powder Apply  to affected area four (4) times daily. Yes Other, MD Estelle   oxyCODONE-acetaminophen (PERCOCET) 5-325 mg per tablet Take  by mouth every four (4) hours as needed for Pain. Yes Other, MD Estelle   PARoxetine (PaxiL) 20 mg tablet Take  by mouth daily. Yes Other, MD Estelle   pantoprazole (Protonix) 40 mg tablet Take 40 mg by mouth daily. Yes Other, MD Estelle   montelukast (Singulair) 10 mg tablet Take 10 mg by mouth daily. Yes Other, MD Estelle   tamsulosin (FLOMAX) 0.4 mg capsule Take 0.4 mg by mouth daily. Yes Other, MD Estelle   tiZANidine (ZANAFLEX) 2 mg tablet Take  by mouth. Yes Other, MD Estelle   predniSONE (DELTASONE) 20 mg tablet Take  by mouth daily (with breakfast). Yes Provider, Historical   budesonide-formoterol (SYMBICORT) 160-4.5 mcg/actuation HFA inhaler Take 2 Puffs by inhalation two (2) times a day. Provider, Historical   denosumab (PROLIA) 60 mg/mL injection 60 mg by SubCUTAneous route.     Provider, Historical         Current Facility-Administered Medications:     sodium chloride (NS) flush 5-10 mL, 5-10 mL, IntraVENous, PRN, Estrellita Preston MD    [COMPLETED] sodium chloride 0.9 % bolus infusion 1,000 mL, 1,000 mL, IntraVENous, ONCE, 1,000 mL at 12/04/20 1307 **FOLLOWED BY** [COMPLETED] sodium chloride 0.9 % bolus infusion 1,000 mL, 1,000 mL, IntraVENous, ONCE, 1,000 mL at 12/04/20 1425 **FOLLOWED BY** sodium chloride 0.9 % bolus infusion 721 mL, 721 mL, IntraVENous, ONCE, Ramiro Jameson MD    albuterol (PROVENTIL HFA, VENTOLIN HFA, PROAIR HFA) inhaler 2 Puff, 2 Puff, Inhalation, ONCE, Dina Méndez NP    cefTRIAXone (ROCEPHIN) 1 g in sterile water (preservative free) 10 mL IV syringe, 1 g, IntraVENous, Q24H, Donald Carrillo MD    Current Outpatient Medications:     fluticasone propion-salmeteroL (Advair Diskus) 250-50 mcg/dose diskus inhaler, Take 1 Puff by inhalation every twelve (12) hours. , Disp: , Rfl:     albuterol (PROVENTIL VENTOLIN) 2.5 mg /3 mL (0.083 %) nebu, by Nebulization route., Disp: , Rfl:     allopurinoL (ZYLOPRIM) 100 mg tablet, Take  by mouth daily. , Disp: , Rfl:     saliva substitution (Biotene Dry Mouth Oral Rinse) mwsh mouthwash, 15 mL by Mucous Membrane route as needed. , Disp: , Rfl:     bisacodyL (DULCOLAX) 5 mg EC tablet, Take 5 mg by mouth daily as needed for Constipation. , Disp: , Rfl:     capsaicin (CAPZASIN-HP) 0.1 % topical cream, Apply  to affected area three (3) times daily. , Disp: , Rfl:     cholecalciferol (VITAMIN D3) 25 mcg (1,000 unit) cap, Take  by mouth daily. , Disp: , Rfl:     ipratropium-albuteroL (Combivent Respimat)  mcg/actuation inhaler, Take 1 Puff by inhalation every six (6) hours. , Disp: , Rfl:     rosuvastatin (Crestor) 20 mg tablet, Take 20 mg by mouth nightly., Disp: , Rfl:     folic acid (FOLVITE) 1 mg tablet, Take  by mouth daily. , Disp: , Rfl:     alendronate (Fosamax) 70 mg tablet, Take  by mouth., Disp: , Rfl:     gabapentin (NEURONTIN) 300 mg capsule, Take 300 mg by mouth three (3) times daily. , Disp: , Rfl:     metoprolol tartrate (LOPRESSOR) 25 mg tablet, Take  by mouth two (2) times a day., Disp: , Rfl:     multivitamin (ONE A DAY) tablet, Take 1 Tab by mouth daily. , Disp: , Rfl:     nystatin (MYCOSTATIN) powder, Apply  to affected area four (4) times daily. , Disp: , Rfl:     oxyCODONE-acetaminophen (PERCOCET) 5-325 mg per tablet, Take  by mouth every four (4) hours as needed for Pain., Disp: , Rfl:     PARoxetine (PaxiL) 20 mg tablet, Take  by mouth daily. , Disp: , Rfl:     pantoprazole (Protonix) 40 mg tablet, Take 40 mg by mouth daily. , Disp: , Rfl:     montelukast (Singulair) 10 mg tablet, Take 10 mg by mouth daily. , Disp: , Rfl:     tamsulosin (FLOMAX) 0.4 mg capsule, Take 0.4 mg by mouth daily. , Disp: , Rfl:     tiZANidine (ZANAFLEX) 2 mg tablet, Take  by mouth., Disp: , Rfl:     predniSONE (DELTASONE) 20 mg tablet, Take  by mouth daily (with breakfast). , Disp: , Rfl:     budesonide-formoterol (SYMBICORT) 160-4.5 mcg/actuation HFA inhaler, Take 2 Puffs by inhalation two (2) times a day., Disp: , Rfl:     denosumab (PROLIA) 60 mg/mL injection, 60 mg by SubCUTAneous route., Disp: , Rfl:     LAB DATA REVIEWED:    Recent Results (from the past 24 hour(s))   CBC WITH AUTOMATED DIFF    Collection Time: 12/04/20 12:30 PM   Result Value Ref Range    WBC 10.5 3.6 - 11.0 K/uL    RBC 3.02 (L) 3.80 - 5.20 M/uL    HGB 9.2 (L) 11.5 - 16.0 g/dL    HCT 30.2 (L) 35.0 - 47.0 %    .0 (H) 80.0 - 99.0 FL    MCH 30.5 26.0 - 34.0 PG    MCHC 30.5 30.0 - 36.5 g/dL    RDW 20.1 (H) 11.5 - 14.5 %    PLATELET 021 022 - 547 K/uL    MPV 11.4 8.9 - 12.9 FL    NRBC 0.7 (H) 0  WBC    ABSOLUTE NRBC 0.07 (H) 0.00 - 0.01 K/uL    NEUTROPHILS PENDING %    LYMPHOCYTES PENDING %    MONOCYTES PENDING %    EOSINOPHILS PENDING %    BASOPHILS PENDING %    IMMATURE GRANULOCYTES PENDING %    ABS. NEUTROPHILS PENDING K/UL    ABS. LYMPHOCYTES PENDING K/UL    ABS. MONOCYTES PENDING K/UL    ABS. EOSINOPHILS PENDING K/UL    ABS. BASOPHILS PENDING K/UL    ABS. IMM. GRANS.  PENDING K/UL    DF PENDING    METABOLIC PANEL, COMPREHENSIVE    Collection Time: 12/04/20 12:30 PM   Result Value Ref Range    Sodium 137 136 - 145 mmol/L    Potassium Hemolyzed, recollect requested 3.5 - 5.1 mmol/L    Chloride 105 97 - 108 mmol/L    CO2 23 21 - 32 mmol/L    Anion gap 9 5 - 15 mmol/L    Glucose 405 (H) 65 - 100 mg/dL    BUN 45 (H) 6 - 20 mg/dL    Creatinine 2.76 (H) 0.55 - 1.02 mg/dL    BUN/Creatinine ratio 16 12 - 20      GFR est AA 21 (L) >60 ml/min/1.73m2    GFR est non-AA 17 (L) >60 ml/min/1.73m2    Calcium 8.3 (L) 8.5 - 10.1 mg/dL    Bilirubin, total 0.7 0.2 - 1.0 mg/dL    AST (SGOT) Hemolyzed, recollect requested 15 - 37 U/L    ALT (SGPT) 48 12 - 78 U/L    Alk. phosphatase 154 (H) 45 - 117 U/L    Protein, total 5.5 (L) 6.4 - 8.2 g/dL    Albumin 1.8 (L) 3.5 - 5.0 g/dL    Globulin 3.7 2.0 - 4.0 g/dL    A-G Ratio 0.5 (L) 1.1 - 2.2     BNP    Collection Time: 12/04/20 12:30 PM   Result Value Ref Range    NT pro- (H) <125 pg/mL   TROPONIN I    Collection Time: 12/04/20 12:30 PM   Result Value Ref Range    Troponin-I, Qt. <0.05 <0.05 ng/mL   LACTIC ACID    Collection Time: 12/04/20 12:30 PM   Result Value Ref Range    Lactic acid 2.0 0.4 - 2.0 mmol/L   SARS-COV-2    Collection Time: 12/04/20  2:28 PM   Result Value Ref Range    SARS-CoV-2 Please find results under separate order         XR Results (most recent):  Results from Hospital Encounter encounter on 12/04/20   XR HAND LT MIN 3 V    Narrative Left hand, 3 views    Significant joint space narrowing; predominant wrist and interphalangeal joints. First carpal-metacarpal joint and DIP joint index finger with mild subluxation. No fracture evident. Impression IMPRESSION: Advanced osteoarthritis. CT HEAD WO CONT   Final Result   Impression: Unremarkable head CT without contrast.  No infarct, mass, or    hemorrhage. No skull fracture or traumatic soft tissue swelling. Please see full   report. XR HAND LT MIN 3 V   Final Result   IMPRESSION: Advanced osteoarthritis.       XR CHEST PORT   Final Result           Review of Systems:  unable. Objective:   VITALS:    Visit Vitals  BP (!) 99/52   Pulse 96   Temp 98.2 °F (36.8 °C)   Resp 23   Ht 5' 2\" (1.575 m)   Wt 90.7 kg (200 lb)   SpO2 99%   BMI 36.58 kg/m²       Physical Exam:   Constitutional: Patient awake say few words   head: Normocephalic and atraumatic. Eyes: Pupils are equal, round, and reactive to light. EOM are normal.   Cardiovascular: Normal rate, regular rhythm and normal heart sounds. Pulmonary/Chest: Breath sounds normal. No wheezes. No rales. Exhibits no tenderness. Abdominal: Soft. Bowel sounds are normal. There is no abdominal tenderness. There is no rebound and no guarding. Musculoskeletal: Normal range of motion. Neurological awake to herself  Extremities 2+ edema.         ASSESSMENT & PLAN:    Hypotensive  Acute kidney injury  Hyperglycemia  History of sarcoidosis  History of COPD  Morbid obesity  Functional quadriplegic     Order blood cultures urine cultures start on IV Rocephin  IV fluids  Do ID consult  Monitor blood pressure closely  Monitor blood sugars closely  Start on sliding scale    ________________________________________________________________________    Signed: Billy Perea MD

## 2020-12-05 PROBLEM — R65.21 SEPTIC SHOCK (HCC): Status: ACTIVE | Noted: 2020-01-01

## 2020-12-05 PROBLEM — A41.9 SEPTIC SHOCK (HCC): Status: ACTIVE | Noted: 2020-01-01

## 2020-12-05 NOTE — PROGRESS NOTES
Consult for Vancomycin Dosing by Pharmacy by Sarahi Fernandes provided for this 76y.o. year old female , for indication of UTI. Day of Therapy: 2  Goal of Level(s): 15-20mcg/dL    Other Current Antibiotics: Zosyn    Significant Cultures:       Date                             Culture                                       Organism      12/04                            Blood x 4                                  GPC in clusters    Results       Procedure Component Value Units Date/Time    MRSA SCREEN - PCR (NASAL) [[id number]]  (Abnormal) Collected:  12/04/20 1850    Order Status:  Completed Specimen:  Swab Updated:  12/04/20 2125     MRSA by PCR, Nasal DETECTED        Comment: Results verified, phoned to and read back by [email or web address]       CULTURE, URINE [[id number]] Collected:  12/04/20 1515    Order Status:  Completed Specimen:  Urine Updated:  12/04/20 1534    CULTURE, BLOOD, LINE [[id number]]     Order Status:  Sent Specimen:  Blood     COVID-19 RAPID TEST [[id number]] Collected:  12/04/20 1428    Order Status:  Completed Specimen:  Nasopharyngeal Updated:  12/04/20 1600     Specimen source Nasopharyngeal        COVID-19 rapid test Not Detected        Comment: Rapid Abbott ID Now   Rapid NAAT:  The specimen is NEGATIVE for SARS-CoV-2, the novel coronavirus associated with COVID-19. Negative results should be treated as presumptive and, if inconsistent with clinical signs and symptoms or necessary for patient management, should be tested with an alternative molecular assay. Negative results do not preclude SARS-CoV-2 infection and should not be used as the sole basis for patient management decisions. This test has been authorized by the FDA under   an Emergency Use Authorization (EUA) for use by authorized laboratories.  Fact sheet for Healthcare Providers: ConventionUpdate.co.nz Fact sheet for Patients: ConventionUpdate.co.nz   Methodology: Isothermal Nucleic Acid Amplification         INFLUENZA A & B AG (RAPID TEST) [[id number]] Collected:  12/04/20 1400    Order Status:  Completed Specimen:  Nasopharyngeal from Nasal washing Updated:  12/04/20 1624     Influenza A Antigen Negative        Influenza B Antigen Negative       CULTURE, BLOOD [[id number]] Collected:  12/04/20 1230    Order Status:  Completed Specimen:  Blood Updated:  12/05/20 1230     Special Requests: No Special Requests        Culture result:       Two of two bottles have been flagged positive by instrument. Bottles have been sent to [facility] laboratory to assess for possible growth. Gram Positive Cocci in clusters CALLED TO AND READ BACK BY Ora Rubénp R.N. [id number] 12/05/20          CULTURE, BLOOD [[id number]] Collected:  12/04/20 1230    Order Status:  Completed Specimen:  Blood Updated:  12/05/20 1233     Special Requests: No Special Requests        Culture result:       Two of two bottles have been flagged positive by instrument. Bottles have been sent to [facility] laboratory to assess for possible growth. Gram Positive Cocci in clusters CALLED TO AND READ BACK BY Ora Olp R.N. [id number] 12/05/20          URINE CULTURE HOLD SAMPLE [[id number]]     Order Status:  Sent Specimen:  Urine               Serum Creatinine Creatinine   Date Value Ref Range Status   12/05/2020 2.88 (H) 0.55 - 1.02 mg/dL Final   12/04/2020 2.76 (H) 0.55 - 1.02 mg/dL Final      Creatinine Clearance Estimated Creatinine Clearance: 18.7 mL/min (A) (based on SCr of 2.88 mg/dL (H)). BUN Lab Results   Component Value Date/Time    BUN 43 (H) 12/05/2020 04:30 AM      WBC Lab Results   Component Value Date/Time    WBC 5.2 12/05/2020 04:30 AM      Temp 98.2 °F (36.8 °C)     Last Level: No results found for: VANCT   Vancomycin, random   Date Value Ref Range Status   12/05/2020 11.3 ug/mL Final     Comment:     No reference range has been established.        Ht Readings from Last 1 Encounters:   12/05/20 157.5 cm (62\")        Wt Readings from Last 1 Encounters:   12/04/20 78.7 kg (173 lb 8 oz)     Ideal body weight: 50.1 kg (110 lb 7.2 oz)  Adjusted ideal body weight: 61.5 kg (135 lb 10.7 oz)     Previous Regimen: 1000mg IV x 1 on 12/4 at 18:04    New Regimen: Vancomycin 750mg IV x 1 at 16:00. Vancomycin random level ordered for 12/7 AM.    Pharmacy to follow daily and will make changes to dose and/or frequency based on clinical status.     _________________________________     Pharmacist SELMA Kuo [facility]

## 2020-12-05 NOTE — PROCEDURES
Date: 12/5/2020  Time: 0840  Indication: Hemodynamic monitoring/Intravenous access  Provider: Tony Jain MD   A time-out was completed verifying correct patient, procedure, site, positioning, and special equipment if applicable. The patient was placed in a dependent position appropriate for central line placement based on the vein to be cannulated. The patients right neck was prepped and draped in sterile fashion. 1% Lidocaine was used to anesthetize the surrounding skin area. A triple lumen Cordis catheter was introduced into the the internal jugular vein using the Seldinger technique and under ultrasound guidance. The catheter was threaded smoothly over the guide wire and appropriate blood return was obtained. Each lumen of the catheter was evacuated of air and flushed with sterile saline. The catheter was then secured in place to the skin and a sterile dressing applied.    Estimated Blood Loss: 5 cc  The patient tolerated the procedure well and there were no complications

## 2020-12-05 NOTE — PROGRESS NOTES
Patients heart rate is 132. Dr Karol Moffett notified and he ordered a one time dose of Digoxin and to consult cardiology.

## 2020-12-05 NOTE — CONSULTS
NEPHROLOGY CONSULT NOTE    Patient: Darlene Sung MRN: [id number]  PCP: Jaison Dash MD   :     1955  Age:   76 y.o. Sex:  female      Referring physician: Darryl Seay MD      REASON FOR CONSULT:  SORAYA    HPI:  Darlene Sung is a 76 y.o. female with a history of chronic kidney disease with unknown baseline, COPD, hypertension, sarcoidosis, long-term nursing home resident brought to the emergency department with altered mental status. In the emergency department patient was found to be hypotensive with a systolic blood pressure in the 80s. Code sepsis was called. Patient received aggressive IV fluid resuscitation 3 L of normal saline and started on maintenance IV fluid normal saline 75 cc/h. She remained hypotensive and was placed on pressors including Levophed and phenylephrine and transferred to ICU for higher care. At the time of admission her serum creatinine level was 2.76 with a GFR of 21 mL/min. Patient has very minimal urine output 150 cc since her admission. Her serum creatinine up trended to 2.88. Her initial ABG shows mixed acid-base disorder including primary respiratory acidosis and metabolic acidosis. nephrology is contacted for the management of acute kidney injury. Patient is poor historian. History is obtained from the medical record. Review of Systems  Positive for edema    Past Medical History:   Diagnosis Date    Chronic obstructive pulmonary disease ([address]     Hypertension     Sarcoidosis        No past surgical history on file.     No Known Allergies    Current Facility-Administered Medications   Medication Dose Route Frequency    PHENYLephrine (RENETTA-SYNEPHRINE) 30 mg in 0.9% sodium chloride 250 mL infusion   mcg/min IntraVENous TITRATE    methylPREDNISolone (PF) (SOLU-MEDROL) injection 40 mg  40 mg IntraVENous Q8H    albumin human 5% (BUMINATE) solution 25 g  25 g IntraVENous ONCE    furosemide (LASIX) injection 80 mg  80 mg IntraVENous ONCE    sodium chloride (NS) flush 5-10 mL  5-10 mL IntraVENous PRN    pantoprazole (PROTONIX) tablet 40 mg  40 mg Oral DAILY    PARoxetine (PAXIL) tablet 10 mg  10 mg Oral DAILY    tamsulosin (FLOMAX) capsule 0.4 mg  0.4 mg Oral DAILY    acetaminophen (TYLENOL) tablet 650 mg  650 mg Oral Q6H PRN    Or    acetaminophen (TYLENOL) suppository 650 mg  650 mg Rectal Q6H PRN    polyethylene glycol (MIRALAX) packet 17 g  17 g Oral DAILY PRN    ondansetron (ZOFRAN ODT) tablet 4 mg  4 mg Oral Q8H PRN    Or    ondansetron (ZOFRAN) injection 4 mg  4 mg IntraVENous Q6H PRN    heparin (porcine) injection 5,000 Units  5,000 Units SubCUTAneous Q8H    insulin lispro (HUMALOG) injection   SubCUTAneous AC&HS    glucose chewable tablet 16 g  4 Tab Oral PRN    dextrose (D50W) injection syrg 12.5-25 g  25-50 mL IntraVENous PRN    glucagon (GLUCAGEN) injection 1 mg  1 mg IntraMUSCular PRN    piperacillin-tazobactam (ZOSYN) 3.375 g in 0.9% sodium chloride (MBP/ADV) 100 mL MBP  3.375 g IntraVENous Q8H    vancomycin RX Dosing information    Other PRN    Vancomycin random level to be done on 12/5/2020 at 0600 am.   Other ONCE    NOREPINephrine (LEVOPHED) 8 mg in 5% dextrose 250mL (32 mcg/mL) infusion  0.5-16 mcg/min IntraVENous TITRATE       No family history on file.     Social History     Socioeconomic History    Marital status: SINGLE     Spouse name: Not on file    Number of children: Not on file    Years of education: Not on file    Highest education level: Not on file   Occupational History    Not on file   Social Needs    Financial resource strain: Not on file    Food insecurity     Worry: Not on file     Inability: Not on file    Transportation needs     Medical: Not on file     Non-medical: Not on file   Tobacco Use    Smoking status: Never Smoker    Smokeless tobacco: Never Used   Substance and Sexual Activity    Alcohol use: No    Drug use: No    Sexual activity: Not on file   Lifestyle    Physical activity     Days per week: Not on file     Minutes per session: Not on file    Stress: Not on file   Relationships    Social connections     Talks on phone: Not on file     Gets together: Not on file     Attends Anabaptism service: Not on file     Active member of club or organization: Not on file     Attends meetings of clubs or organizations: Not on file     Relationship status: Not on file    Intimate partner violence     Fear of current or ex partner: Not on file     Emotionally abused: Not on file     Physically abused: Not on file     Forced sexual activity: Not on file   Other Topics Concern    Not on file   Social History Narrative    Not on file       Vitals:    12/05/20 0830 12/05/20 0840 12/05/20 0942 12/05/20 1000   BP: 90/74 (!) 70/50 (!) 79/57 (!) 81/56   Pulse: (!) 110 (!) 108 (!) 112 (!) 109   Resp:   23 22   Temp:       SpO2:   98% 93%   Weight:       Height:             Intake/Output Summary (Last 24 hours) at 12/5/2020 1141  Last data filed at 12/4/2020 2300  Gross per 24 hour   Intake    Output 30 ml   Net -30 ml       PHYSICAL EXAM:  GENERAL : Chronically ill looking female lying down in bed with mild to moderately distressed  HEENT: AT NC PEERLA   NECK: Supple no JVP  CVS: S1 S2 RRR, tachycardia no murmur or gallops heard  RS: Diminished breath sounds with rales present in both bases  ABDOMEN: soft NT ND positive BS  EXTREMITY: + edema in both ext, no clubbing or cyanosis, pedal pulse +  NEUROLOGY: AA X2         LABS/STUDIES:  BMP:   Lab Results   Component Value Date/Time     12/05/2020 04:30 AM    K 4.3 12/05/2020 04:30 AM     (H) 12/05/2020 04:30 AM    CO2 19 (L) 12/05/2020 04:30 AM    AGAP 10 12/05/2020 04:30 AM     (H) 12/05/2020 04:30 AM    BUN 43 (H) 12/05/2020 04:30 AM    CREA 2.88 (H) 12/05/2020 04:30 AM    GFRAA 20 (L) 12/05/2020 04:30 AM    GFRNA 16 (L) 12/05/2020 04:30 AM        CBC:    Lab Results   Component Value Date/Time    WBC 5.2 12/05/2020 04:30 AM    HGB 9.6 (L) 12/05/2020 04:30 AM    HCT 31.4 (L) 12/05/2020 04:30 AM     12/05/2020 04:30 AM       No results found for: MCACR, MCA1, MCA2, MCA3, MCAU, MCAU2, MCALPOCT    Lab Results   Component Value Date/Time    Color Yellow/Straw 12/04/2020 12:30 PM    Appearance Turbid (A) 12/04/2020 12:30 PM    Specific gravity 1.009 12/04/2020 12:30 PM    pH (UA) 5.0 12/04/2020 12:30 PM    Protein 100 (A) 12/04/2020 12:30 PM    Glucose >300 (A) 12/04/2020 12:30 PM    Ketone Negative 12/04/2020 12:30 PM    Bilirubin Negative 12/04/2020 12:30 PM    Urobilinogen 0.1 12/04/2020 12:30 PM    Nitrites Negative 12/04/2020 12:30 PM    Leukocyte Esterase Moderate (A) 12/04/2020 12:30 PM    Bacteria 4+ (A) 12/04/2020 12:30 PM    WBC >100 (H) 12/04/2020 12:30 PM    RBC >100 (H) 12/04/2020 12:30 PM         ASSESSMENT/PLAN:    Acute kidney injury due to ischemic ATN in the setting of hypotension, sepsis  BUN/creatinine continue to up trended  Patient is oligo anuric  She has acid-base disturbance and evidence of hypervolemia  She is critically ill with multiorgan failure including hypoxic respiratory failure, circulatory failure on vasopressor and now oligoanuric acute kidney injury  She will benefit from CRRT in the form of CVVH  I will give IV albumin and Lasix 80 mg IV x1  I will start the patient on CVVH: Qb:200 ml/hr,RF :25 ml/kg 4K 2.5 CA bath and attempt achieve net UF:25 ml/hr  Continue on vasopressor to keep map above 65  I will discontinue IV fluid  Renally dose all medication with current GFR  Avoid hypotension and nephrotoxic agent  Care plan is discussed with patient daughter Digna Baugh ([phone]), interventional radiologist, primary care physician and ICU nurse    Hypotension: Due to sepsis  Continue Levophed and phenylephrine to keep map above 65    Sepsis: Broad-spectrum antibiotic therapy  Recommended renally dose all medication with current GFR    Metabolic acidosis: Due to acute kidney injury  I will address with CVVH      Nimesh Mckeon MD  12/5/2020    [address] [place] [address]

## 2020-12-05 NOTE — PROGRESS NOTES
Anesthesia was called to ask to put in a central line for the patient. They stated they could not do it.

## 2020-12-05 NOTE — PROGRESS NOTES
General Daily Progress Note          Patient Name:   Denette Spurling       YOB: 1955       Age:  76 y.o. Admit Date: 12/4/2020      Subjective:         Patient open eyes talking few words  Hypotensive on Levophed and neodrip  Minimal urine output        Objective:     Visit Vitals  BP (!) 79/57 (BP 1 Location: Left leg, BP Patient Position: At rest;Head of bed elevated (Comment degrees))   Pulse (!) 112   Temp 98.3 °F (36.8 °C)   Resp 23   Ht 5' 2\" (1.575 m)   Wt 78.7 kg (173 lb 8 oz)   SpO2 98%   BMI 31.73 kg/m²        Recent Results (from the past 24 hour(s))   CBC WITH AUTOMATED DIFF    Collection Time: 12/04/20 12:30 PM   Result Value Ref Range    WBC 10.5 3.6 - 11.0 K/uL    RBC 3.02 (L) 3.80 - 5.20 M/uL    HGB 9.2 (L) 11.5 - 16.0 g/dL    HCT 30.2 (L) 35.0 - 47.0 %    .0 (H) 80.0 - 99.0 FL    MCH 30.5 26.0 - 34.0 PG    MCHC 30.5 30.0 - 36.5 g/dL    RDW 20.1 (H) 11.5 - 14.5 %    PLATELET 257 190 - 576 K/uL    MPV 11.4 8.9 - 12.9 FL    NRBC 0.7 (H) 0  WBC    ABSOLUTE NRBC 0.07 (H) 0.00 - 0.01 K/uL    NEUTROPHILS 67 32 - 75 %    BAND NEUTROPHILS 5 0 - 6 %    LYMPHOCYTES 9 (L) 12 - 49 %    MONOCYTES 14 (H) 5 - 13 %    EOSINOPHILS 1 0 - 7 %    BASOPHILS 0 0 - 1 %    METAMYELOCYTES 1 (H) 0 %    MYELOCYTES 3 (H) 0 %    IMMATURE GRANULOCYTES 0 %    ABS. NEUTROPHILS 7.6 1.8 - 8.0 K/UL    ABS. LYMPHOCYTES 0.9 0.8 - 3.5 K/UL    ABS. MONOCYTES 1.5 (H) 0.0 - 1.0 K/UL    ABS. EOSINOPHILS 0.1 0.0 - 0.4 K/UL    ABS. BASOPHILS 0.0 0.0 - 0.1 K/UL    ABS. IMM.  GRANS. 0.0 K/UL    DF Manual      RBC COMMENTS Microcytosis  1+       METABOLIC PANEL, COMPREHENSIVE    Collection Time: 12/04/20 12:30 PM   Result Value Ref Range    Sodium 137 136 - 145 mmol/L    Potassium Hemolyzed, recollect requested 3.5 - 5.1 mmol/L    Chloride 105 97 - 108 mmol/L    CO2 23 21 - 32 mmol/L    Anion gap 9 5 - 15 mmol/L    Glucose 405 (H) 65 - 100 mg/dL    BUN 45 (H) 6 - 20 mg/dL    Creatinine 2.76 (H) 0.55 - 1.02 mg/dL BUN/Creatinine ratio 16 12 - 20      GFR est AA 21 (L) >60 ml/min/1.73m2    GFR est non-AA 17 (L) >60 ml/min/1.73m2    Calcium 8.3 (L) 8.5 - 10.1 mg/dL    Bilirubin, total 0.7 0.2 - 1.0 mg/dL    AST (SGOT) Hemolyzed, recollect requested 15 - 37 U/L    ALT (SGPT) 48 12 - 78 U/L    Alk.  phosphatase 154 (H) 45 - 117 U/L    Protein, total 5.5 (L) 6.4 - 8.2 g/dL    Albumin 1.8 (L) 3.5 - 5.0 g/dL    Globulin 3.7 2.0 - 4.0 g/dL    A-G Ratio 0.5 (L) 1.1 - 2.2     BNP    Collection Time: 12/04/20 12:30 PM   Result Value Ref Range    NT pro- (H) <125 pg/mL   TROPONIN I    Collection Time: 12/04/20 12:30 PM   Result Value Ref Range    Troponin-I, Qt. <0.05 <0.05 ng/mL   CULTURE, BLOOD    Collection Time: 12/04/20 12:30 PM    Specimen: Blood   Result Value Ref Range    Special Requests: No Special Requests      Culture result: No growth after 16 hours     LACTIC ACID    Collection Time: 12/04/20 12:30 PM   Result Value Ref Range    Lactic acid 2.0 0.4 - 2.0 mmol/L   CULTURE, BLOOD    Collection Time: 12/04/20 12:30 PM    Specimen: Blood   Result Value Ref Range    Special Requests: No Special Requests      Culture result: No growth after 16 hours     URINALYSIS W/MICROSCOPIC    Collection Time: 12/04/20 12:30 PM   Result Value Ref Range    Color Yellow/Straw      Appearance Turbid (A) Clear      Specific gravity 1.009 1.003 - 1.030      pH (UA) 5.0 5.0 - 8.0      Protein 100 (A) Negative mg/dL    Glucose >300 (A) Negative mg/dL    Ketone Negative Negative mg/dL    Bilirubin Negative Negative      Blood Moderate (A) Negative      Urobilinogen 0.1 0.1 - 1.0 EU/dL    Nitrites Negative Negative      Leukocyte Esterase Moderate (A) Negative      WBC >100 (H) 0 - 4 /hpf    RBC >100 (H) 0 - 5 /hpf    Bacteria 4+ (A) Negative /hpf   INFLUENZA A & B AG (RAPID TEST)    Collection Time: 12/04/20  2:00 PM   Result Value Ref Range    Influenza A Antigen Negative Negative      Influenza B Antigen Negative Negative     SARS-COV-2 Collection Time: 12/04/20  2:28 PM   Result Value Ref Range    SARS-CoV-2 Please find results under separate order     COVID-19 RAPID TEST    Collection Time: 12/04/20  2:28 PM   Result Value Ref Range    Specimen source Nasopharyngeal      COVID-19 rapid test Not Detected Not Detected     BLOOD GAS, ARTERIAL    Collection Time: 12/04/20  2:51 PM   Result Value Ref Range    pH 7.19 (LL) 7.35 - 7.45      PCO2 27 (L) 35 - 45 mmHg    PO2 20 (LL) 75 - 100 mmHg    O2 SAT 27 (LL) >95 %    BICARBONATE 11 (L) 22 - 26 mmol/L    BASE DEFICIT 16.2 (H) 0 - 2 mmol/L    O2 FLOW RATE 4 L/min    FIO2 36 %    Critical value read back APRIL COST RN    DRUG SCREEN, URINE    Collection Time: 12/04/20  3:15 PM   Result Value Ref Range    AMPHETAMINES Negative Negative      BARBITURATES Negative Negative      BENZODIAZEPINES Negative Negative      COCAINE Negative Negative      METHADONE Negative Negative      OPIATES Negative Negative      PCP(PHENCYCLIDINE) Negative Negative      THC (TH-CANNABINOL) Negative Negative      Drug screen comment        This test is a screen for drugs of abuse in a medical setting only (i.e., they are unconfirmed results and as such must not be used for non-medical purposes, e.g.,employment testing, legal testing). Due to its inherent nature, false positive (FP) and false negative (FN) results may be obtained. Therefore, if necessary for medical care, recommend confirmation of positive findings by GC/MS.    BLOOD GAS, ARTERIAL    Collection Time: 12/04/20  3:30 PM   Result Value Ref Range    pH 7.302 (L) 7.35 - 7.45      PCO2 39 35 - 45 mmHg    PO2 134 (H) 75 - 100 mmHg    O2  >95 %    BICARBONATE 19 (L) 22 - 26 mmol/L    BASE DEFICIT 6.6 (H) 0 - 2 mmol/L    O2 METHOD Nasal Cannula      O2 FLOW RATE 2.0 L/min    SITE Left Brachial     MRSA SCREEN - PCR (NASAL)    Collection Time: 12/04/20  6:50 PM   Result Value Ref Range    MRSA by PCR, Nasal DETECTED (A) Not Detected     GLUCOSE, POC    Collection Time: 12/04/20 10:21 PM   Result Value Ref Range    Glucose (POC) 279 (H) 65 - 100 mg/dL    Performed by Dean Villalta    METABOLIC PANEL, COMPREHENSIVE    Collection Time: 12/05/20  4:30 AM   Result Value Ref Range    Sodium 141 136 - 145 mmol/L    Potassium 4.3 3.5 - 5.1 mmol/L    Chloride 112 (H) 97 - 108 mmol/L    CO2 19 (L) 21 - 32 mmol/L    Anion gap 10 5 - 15 mmol/L    Glucose 174 (H) 65 - 100 mg/dL    BUN 43 (H) 6 - 20 mg/dL    Creatinine 2.88 (H) 0.55 - 1.02 mg/dL    BUN/Creatinine ratio 15 12 - 20      GFR est AA 20 (L) >60 ml/min/1.73m2    GFR est non-AA 16 (L) >60 ml/min/1.73m2    Calcium 7.5 (L) 8.5 - 10.1 mg/dL    Bilirubin, total 0.5 0.2 - 1.0 mg/dL    AST (SGOT) 40 (H) 15 - 37 U/L    ALT (SGPT) 46 12 - 78 U/L    Alk. phosphatase 155 (H) 45 - 117 U/L    Protein, total 5.6 (L) 6.4 - 8.2 g/dL    Albumin 1.5 (L) 3.5 - 5.0 g/dL    Globulin 4.1 (H) 2.0 - 4.0 g/dL    A-G Ratio 0.4 (L) 1.1 - 2.2     CBC WITH AUTOMATED DIFF    Collection Time: 12/05/20  4:30 AM   Result Value Ref Range    WBC 5.2 3.6 - 11.0 K/uL    RBC 3.20 (L) 3.80 - 5.20 M/uL    HGB 9.6 (L) 11.5 - 16.0 g/dL    HCT 31.4 (L) 35.0 - 47.0 %    MCV 98.1 80.0 - 99.0 FL    MCH 30.0 26.0 - 34.0 PG    MCHC 30.6 30.0 - 36.5 g/dL    RDW 19.8 (H) 11.5 - 14.5 %    PLATELET 716 622 - 822 K/uL    MPV 9.4 8.9 - 12.9 FL    NRBC 5.4 (H) 0  WBC    ABSOLUTE NRBC 0.28 (H) 0.00 - 0.01 K/uL    NEUTROPHILS 74 32 - 75 %    LYMPHOCYTES 21 12 - 49 %    MONOCYTES 3 (L) 5 - 13 %    EOSINOPHILS 0 0 - 7 %    BASOPHILS 2 (H) 0 - 1 %    IMMATURE GRANULOCYTES 0 %    ABS. NEUTROPHILS 3.8 1.8 - 8.0 K/UL    ABS. LYMPHOCYTES 1.1 0.8 - 3.5 K/UL    ABS. MONOCYTES 0.2 0.0 - 1.0 K/UL    ABS. EOSINOPHILS 0.0 0.0 - 0.4 K/UL    ABS. BASOPHILS 0.1 0.0 - 0.1 K/UL    ABS. IMM.  GRANS. 0.0 K/UL    DF AUTOMATED      RBC COMMENTS Normocytic, Normochromic     GLUCOSE, POC    Collection Time: 12/05/20  7:10 AM   Result Value Ref Range    Glucose (POC) 151 (H) 65 - 100 mg/dL Performed by Carola Flores      [unfilled]      Review of Systems    Unable to obtain    Physical Exam:      Constitutional: Very lethargic  HENT:   Head: Normocephalic and atraumatic. Eyes: Pupils are equal, round, and reactive to light. EOM are normal.   Cardiovascular: Normal rate, regular rhythm and normal heart sounds. Pulmonary/Chest: Breath sounds normal. No wheezes. No rales. Exhibits no tenderness. Abdominal: Soft. Bowel sounds are normal. There is no abdominal tenderness. There is no rebound and no guarding. Musculoskeletal: Normal range of motion. Neurological: Very lethargic  XR CHEST PORT   Final Result   FINDINGS/IMPRESSION:   Radiograph is limited by patient's body habitus and AP portable technique. Support catheter right-sided approach, distal aspect projects over the right   atrium. Negative for pneumothorax. Calcified mediastinal and hilar lymph nodes consistent with sequela of prior   granulomatous insult. Reticular opacity present throughout the lungs consistent with pre-existing   interstitial lung disease. CT HEAD WO CONT   Final Result   Impression: Unremarkable head CT without contrast.  No infarct, mass, or    hemorrhage. No skull fracture or traumatic soft tissue swelling. Please see full   report. XR HAND LT MIN 3 V   Final Result   IMPRESSION: Advanced osteoarthritis.       XR CHEST PORT   Final Result      XR CHEST PORT    (Results Pending)        Recent Results (from the past 24 hour(s))   CBC WITH AUTOMATED DIFF    Collection Time: 12/04/20 12:30 PM   Result Value Ref Range    WBC 10.5 3.6 - 11.0 K/uL    RBC 3.02 (L) 3.80 - 5.20 M/uL    HGB 9.2 (L) 11.5 - 16.0 g/dL    HCT 30.2 (L) 35.0 - 47.0 %    .0 (H) 80.0 - 99.0 FL    MCH 30.5 26.0 - 34.0 PG    MCHC 30.5 30.0 - 36.5 g/dL    RDW 20.1 (H) 11.5 - 14.5 %    PLATELET 042 084 - 889 K/uL    MPV 11.4 8.9 - 12.9 FL    NRBC 0.7 (H) 0  WBC    ABSOLUTE NRBC 0.07 (H) 0.00 - 0.01 K/uL NEUTROPHILS 67 32 - 75 %    BAND NEUTROPHILS 5 0 - 6 %    LYMPHOCYTES 9 (L) 12 - 49 %    MONOCYTES 14 (H) 5 - 13 %    EOSINOPHILS 1 0 - 7 %    BASOPHILS 0 0 - 1 %    METAMYELOCYTES 1 (H) 0 %    MYELOCYTES 3 (H) 0 %    IMMATURE GRANULOCYTES 0 %    ABS. NEUTROPHILS 7.6 1.8 - 8.0 K/UL    ABS. LYMPHOCYTES 0.9 0.8 - 3.5 K/UL    ABS. MONOCYTES 1.5 (H) 0.0 - 1.0 K/UL    ABS. EOSINOPHILS 0.1 0.0 - 0.4 K/UL    ABS. BASOPHILS 0.0 0.0 - 0.1 K/UL    ABS. IMM. GRANS. 0.0 K/UL    DF Manual      RBC COMMENTS Microcytosis  1+       METABOLIC PANEL, COMPREHENSIVE    Collection Time: 12/04/20 12:30 PM   Result Value Ref Range    Sodium 137 136 - 145 mmol/L    Potassium Hemolyzed, recollect requested 3.5 - 5.1 mmol/L    Chloride 105 97 - 108 mmol/L    CO2 23 21 - 32 mmol/L    Anion gap 9 5 - 15 mmol/L    Glucose 405 (H) 65 - 100 mg/dL    BUN 45 (H) 6 - 20 mg/dL    Creatinine 2.76 (H) 0.55 - 1.02 mg/dL    BUN/Creatinine ratio 16 12 - 20      GFR est AA 21 (L) >60 ml/min/1.73m2    GFR est non-AA 17 (L) >60 ml/min/1.73m2    Calcium 8.3 (L) 8.5 - 10.1 mg/dL    Bilirubin, total 0.7 0.2 - 1.0 mg/dL    AST (SGOT) Hemolyzed, recollect requested 15 - 37 U/L    ALT (SGPT) 48 12 - 78 U/L    Alk.  phosphatase 154 (H) 45 - 117 U/L    Protein, total 5.5 (L) 6.4 - 8.2 g/dL    Albumin 1.8 (L) 3.5 - 5.0 g/dL    Globulin 3.7 2.0 - 4.0 g/dL    A-G Ratio 0.5 (L) 1.1 - 2.2     BNP    Collection Time: 12/04/20 12:30 PM   Result Value Ref Range    NT pro- (H) <125 pg/mL   TROPONIN I    Collection Time: 12/04/20 12:30 PM   Result Value Ref Range    Troponin-I, Qt. <0.05 <0.05 ng/mL   CULTURE, BLOOD    Collection Time: 12/04/20 12:30 PM    Specimen: Blood   Result Value Ref Range    Special Requests: No Special Requests      Culture result: No growth after 16 hours     LACTIC ACID    Collection Time: 12/04/20 12:30 PM   Result Value Ref Range    Lactic acid 2.0 0.4 - 2.0 mmol/L   CULTURE, BLOOD    Collection Time: 12/04/20 12:30 PM    Specimen: Blood Result Value Ref Range    Special Requests: No Special Requests      Culture result: No growth after 16 hours     URINALYSIS W/MICROSCOPIC    Collection Time: 12/04/20 12:30 PM   Result Value Ref Range    Color Yellow/Straw      Appearance Turbid (A) Clear      Specific gravity 1.009 1.003 - 1.030      pH (UA) 5.0 5.0 - 8.0      Protein 100 (A) Negative mg/dL    Glucose >300 (A) Negative mg/dL    Ketone Negative Negative mg/dL    Bilirubin Negative Negative      Blood Moderate (A) Negative      Urobilinogen 0.1 0.1 - 1.0 EU/dL    Nitrites Negative Negative      Leukocyte Esterase Moderate (A) Negative      WBC >100 (H) 0 - 4 /hpf    RBC >100 (H) 0 - 5 /hpf    Bacteria 4+ (A) Negative /hpf   INFLUENZA A & B AG (RAPID TEST)    Collection Time: 12/04/20  2:00 PM   Result Value Ref Range    Influenza A Antigen Negative Negative      Influenza B Antigen Negative Negative     SARS-COV-2    Collection Time: 12/04/20  2:28 PM   Result Value Ref Range    SARS-CoV-2 Please find results under separate order     COVID-19 RAPID TEST    Collection Time: 12/04/20  2:28 PM   Result Value Ref Range    Specimen source Nasopharyngeal      COVID-19 rapid test Not Detected Not Detected     BLOOD GAS, ARTERIAL    Collection Time: 12/04/20  2:51 PM   Result Value Ref Range    pH 7.19 (LL) 7.35 - 7.45      PCO2 27 (L) 35 - 45 mmHg    PO2 20 (LL) 75 - 100 mmHg    O2 SAT 27 (LL) >95 %    BICARBONATE 11 (L) 22 - 26 mmol/L    BASE DEFICIT 16.2 (H) 0 - 2 mmol/L    O2 FLOW RATE 4 L/min    FIO2 36 %    Critical value read back APRIL COST RN    DRUG SCREEN, URINE    Collection Time: 12/04/20  3:15 PM   Result Value Ref Range    AMPHETAMINES Negative Negative      BARBITURATES Negative Negative      BENZODIAZEPINES Negative Negative      COCAINE Negative Negative      METHADONE Negative Negative      OPIATES Negative Negative      PCP(PHENCYCLIDINE) Negative Negative      THC (TH-CANNABINOL) Negative Negative      Drug screen comment        This test is a screen for drugs of abuse in a medical setting only (i.e., they are unconfirmed results and as such must not be used for non-medical purposes, e.g.,employment testing, legal testing). Due to its inherent nature, false positive (FP) and false negative (FN) results may be obtained. Therefore, if necessary for medical care, recommend confirmation of positive findings by GC/MS. BLOOD GAS, ARTERIAL    Collection Time: 12/04/20  3:30 PM   Result Value Ref Range    pH 7.302 (L) 7.35 - 7.45      PCO2 39 35 - 45 mmHg    PO2 134 (H) 75 - 100 mmHg    O2  >95 %    BICARBONATE 19 (L) 22 - 26 mmol/L    BASE DEFICIT 6.6 (H) 0 - 2 mmol/L    O2 METHOD Nasal Cannula      O2 FLOW RATE 2.0 L/min    SITE Left Brachial     MRSA SCREEN - PCR (NASAL)    Collection Time: 12/04/20  6:50 PM   Result Value Ref Range    MRSA by PCR, Nasal DETECTED (A) Not Detected     GLUCOSE, POC    Collection Time: 12/04/20 10:21 PM   Result Value Ref Range    Glucose (POC) 279 (H) 65 - 100 mg/dL    Performed by Elmer GARCIA    METABOLIC PANEL, COMPREHENSIVE    Collection Time: 12/05/20  4:30 AM   Result Value Ref Range    Sodium 141 136 - 145 mmol/L    Potassium 4.3 3.5 - 5.1 mmol/L    Chloride 112 (H) 97 - 108 mmol/L    CO2 19 (L) 21 - 32 mmol/L    Anion gap 10 5 - 15 mmol/L    Glucose 174 (H) 65 - 100 mg/dL    BUN 43 (H) 6 - 20 mg/dL    Creatinine 2.88 (H) 0.55 - 1.02 mg/dL    BUN/Creatinine ratio 15 12 - 20      GFR est AA 20 (L) >60 ml/min/1.73m2    GFR est non-AA 16 (L) >60 ml/min/1.73m2    Calcium 7.5 (L) 8.5 - 10.1 mg/dL    Bilirubin, total 0.5 0.2 - 1.0 mg/dL    AST (SGOT) 40 (H) 15 - 37 U/L    ALT (SGPT) 46 12 - 78 U/L    Alk.  phosphatase 155 (H) 45 - 117 U/L    Protein, total 5.6 (L) 6.4 - 8.2 g/dL    Albumin 1.5 (L) 3.5 - 5.0 g/dL    Globulin 4.1 (H) 2.0 - 4.0 g/dL    A-G Ratio 0.4 (L) 1.1 - 2.2     CBC WITH AUTOMATED DIFF    Collection Time: 12/05/20  4:30 AM   Result Value Ref Range    WBC 5.2 3.6 - 11.0 K/uL    RBC 3.20 (L) 3.80 - 5.20 M/uL    HGB 9.6 (L) 11.5 - 16.0 g/dL    HCT 31.4 (L) 35.0 - 47.0 %    MCV 98.1 80.0 - 99.0 FL    MCH 30.0 26.0 - 34.0 PG    MCHC 30.6 30.0 - 36.5 g/dL    RDW 19.8 (H) 11.5 - 14.5 %    PLATELET 358 168 - 133 K/uL    MPV 9.4 8.9 - 12.9 FL    NRBC 5.4 (H) 0  WBC    ABSOLUTE NRBC 0.28 (H) 0.00 - 0.01 K/uL    NEUTROPHILS 74 32 - 75 %    LYMPHOCYTES 21 12 - 49 %    MONOCYTES 3 (L) 5 - 13 %    EOSINOPHILS 0 0 - 7 %    BASOPHILS 2 (H) 0 - 1 %    IMMATURE GRANULOCYTES 0 %    ABS. NEUTROPHILS 3.8 1.8 - 8.0 K/UL    ABS. LYMPHOCYTES 1.1 0.8 - 3.5 K/UL    ABS. MONOCYTES 0.2 0.0 - 1.0 K/UL    ABS. EOSINOPHILS 0.0 0.0 - 0.4 K/UL    ABS. BASOPHILS 0.1 0.0 - 0.1 K/UL    ABS. IMM. GRANS. 0.0 K/UL    DF AUTOMATED      RBC COMMENTS Normocytic, Normochromic     GLUCOSE, POC    Collection Time: 12/05/20  7:10 AM   Result Value Ref Range    Glucose (POC) 151 (H) 65 - 100 mg/dL    Performed by Maria G Gomez        Results     Procedure Component Value Units Date/Time    MRSA SCREEN - PCR (NASAL) [[id number]]  (Abnormal) Collected:  12/04/20 1850    Order Status:  Completed Specimen:  Swab Updated:  12/04/20 2125     MRSA by PCR, Nasal DETECTED        Comment: Results verified, phoned to and read back by Barbra [email or web address]       CULTURE, URINE [[id number]] Collected:  12/04/20 1515    Order Status:  Completed Specimen:  Urine Updated:  12/04/20 1534    CULTURE, BLOOD, LINE [[id number]]     Order Status:  Sent Specimen:  Blood     COVID-19 RAPID TEST [[id number]] Collected:  12/04/20 1428    Order Status:  Completed Specimen:  Nasopharyngeal Updated:  12/04/20 1600     Specimen source Nasopharyngeal        COVID-19 rapid test Not Detected        Comment: Rapid Abbott ID Now   Rapid NAAT:  The specimen is NEGATIVE for SARS-CoV-2, the novel coronavirus associated with COVID-19.    Negative results should be treated as presumptive and, if inconsistent with clinical signs and symptoms or necessary for patient management, should be tested with an alternative molecular assay. Negative results do not preclude SARS-CoV-2 infection and should not be used as the sole basis for patient management decisions. This test has been authorized by the FDA under   an Emergency Use Authorization (EUA) for use by authorized laboratories. Fact sheet for Healthcare Providers: ConventionUpdate.co.nz Fact sheet for Patients: ConventionUpdate.co.nz   Methodology: Isothermal Nucleic Acid Amplification         INFLUENZA A & B AG (RAPID TEST) [[id number]] Collected:  12/04/20 1400    Order Status:  Completed Specimen:  Nasopharyngeal from Nasal washing Updated:  12/04/20 1624     Influenza A Antigen Negative        Influenza B Antigen Negative       CULTURE, BLOOD [[id number]] Collected:  12/04/20 1230    Order Status:  Completed Specimen:  Blood Updated:  12/05/20 0730     Special Requests: No Special Requests        Culture result: No growth after 16 hours       CULTURE, BLOOD [[id number]] Collected:  12/04/20 1230    Order Status:  Completed Specimen:  Blood Updated:  12/05/20 0730     Special Requests: No Special Requests        Culture result: No growth after 16 hours       URINE CULTURE HOLD SAMPLE [[id number]]     Order Status:  Sent Specimen:  Urine            Labs:     Recent Labs     12/05/20  0430 12/04/20  1230   WBC 5.2 10.5   HGB 9.6* 9.2*   HCT 31.4* 30.2*    179     Recent Labs     12/05/20  0430 12/04/20  1230    137   K 4.3 Hemolyzed, recollect requested   * 105   CO2 19* 23   BUN 43* 45*   CREA 2.88* 2.76*   * 405*   CA 7.5* 8.3*     Recent Labs     12/05/20  0430 12/04/20  1230   ALT 46 48   * 154*   TBILI 0.5 0.7   TP 5.6* 5.5*   ALB 1.5* 1.8*   GLOB 4.1* 3.7     No results for input(s): INR, PTP, APTT, INREXT in the last 72 hours. No results for input(s): FE, TIBC, PSAT, FERR in the last 72 hours.    No results found for: FOL, RBCF   Recent Labs     12/04/20  1530 12/04/20  1451   PH 7.302* 7.19*   PCO2 39 27*   PO2 134* 20*     Recent Labs     12/04/20  1230   TROIQ <0.05     No results found for: CHOL, CHOLX, CHLST, CHOLV, HDL, HDLP, LDL, LDLC, DLDLP, TGLX, TRIGL, TRIGP, CHHD, CHHDX  Lab Results   Component Value Date/Time    Glucose (POC) 151 (H) 12/05/2020 07:10 AM    Glucose (POC) 279 (H) 12/04/2020 10:21 PM     Lab Results   Component Value Date/Time    Color Yellow/Straw 12/04/2020 12:30 PM    Appearance Turbid (A) 12/04/2020 12:30 PM    Specific gravity 1.009 12/04/2020 12:30 PM    pH (UA) 5.0 12/04/2020 12:30 PM    Protein 100 (A) 12/04/2020 12:30 PM    Glucose >300 (A) 12/04/2020 12:30 PM    Ketone Negative 12/04/2020 12:30 PM    Bilirubin Negative 12/04/2020 12:30 PM    Urobilinogen 0.1 12/04/2020 12:30 PM    Nitrites Negative 12/04/2020 12:30 PM    Leukocyte Esterase Moderate (A) 12/04/2020 12:30 PM    Bacteria 4+ (A) 12/04/2020 12:30 PM    WBC >100 (H) 12/04/2020 12:30 PM    RBC >100 (H) 12/04/2020 12:30 PM         Assessment:     Hypotensive  Acute kidney injury  Hyperglycemia  History of sarcoidosis  History of COPD  Morbid obesity  Functional quadriplegic   UTI      Plan:     Continue IV Zosyn and vancomycin  Order renal ultrasound  Continue Levophed and neodrip for hypotension  Order NG tube and start feeding    Discussed with the patient's family at the phone          Current Facility-Administered Medications:     PHENYLephrine (RENETTA-SYNEPHRINE) 30 mg in 0.9% sodium chloride 250 mL infusion,  mcg/min, IntraVENous, TITRATE, Juaquin Bowens MD, Stopped at 12/05/20 1032    methylPREDNISolone (PF) (SOLU-MEDROL) injection 40 mg, 40 mg, IntraVENous, Q8H, Mark Gonzales MD    lactated Ringers infusion, 125 mL/hr, IntraVENous, CONTINUOUS, Nimesh Winn MD    sodium chloride (NS) flush 5-10 mL, 5-10 mL, IntraVENous, PRN, Donald Crarillo MD    pantoprazole (PROTONIX) tablet 40 mg, 40 mg, Oral, DAILY, Baldemar Carrillo MD    PARoxetine (PAXIL) tablet 10 mg, 10 mg, Oral, DAILY, Nithin Carrillo MD    [facility]) capsule 0.4 mg, 0.4 mg, Oral, DAILY, Nithin Carrillo MD    acetaminophen (TYLENOL) tablet 650 mg, 650 mg, Oral, Q6H PRN **OR** acetaminophen (TYLENOL) suppository 650 mg, 650 mg, Rectal, Q6H PRN, Nithin Carrillo MD    polyethylene glycol (MIRALAX) packet 17 g, 17 g, Oral, DAILY PRN, Nithin Carrillo MD    ondansetron (ZOFRAN ODT) tablet 4 mg, 4 mg, Oral, Q8H PRN **OR** ondansetron (ZOFRAN) injection 4 mg, 4 mg, IntraVENous, Q6H PRN, Donald Carrillo MD    heparin (porcine) injection 5,000 Units, 5,000 Units, SubCUTAneous, Q8H, Donald Carrillo MD, 5,000 Units at 12/05/20 2148    insulin lispro (HUMALOG) injection, , SubCUTAneous, AC&HS, Donald Carrillo MD, 3 Units at 12/05/20 0934    glucose chewable tablet 16 g, 4 Tab, Oral, PRN, Nithin Carrillo MD    dextrose (D50W) injection syrg 12.5-25 g, 25-50 mL, IntraVENous, PRN, Nithin Carrillo MD    glucagon (GLUCAGEN) injection 1 mg, 1 mg, IntraMUSCular, PRN, Nithin Carrillo MD    piperacillin-tazobactam (ZOSYN) 3.375 g in 0.9% sodium chloride (MBP/ADV) 100 mL MBP, 3.375 g, IntraVENous, Q8H, Donald Carrillo MD, Last Rate: 200 mL/hr at 12/05/20 0933, 3.375 g at 12/05/20 1690    vancomycin RX Dosing information , , Other, PRN, Marcelino Vega MD    Vancomycin random level to be done on 12/5/2020 at 0600 am., , Other, ONCE, Nitihn Carrillo MD    NOREPINephrine (LEVOPHED) 8 mg in 5% dextrose 250mL (32 mcg/mL) infusion, 0.5-16 mcg/min, IntraVENous, TITRATE, Donald Carrillo MD, Last Rate: 15 mL/hr at 12/05/20 0932, 8 mcg/min at 12/05/20 0932

## 2020-12-05 NOTE — PROGRESS NOTES
Bedside shift change report given to Denise Staples RN by JUAN Gay RN. Report included the following information SBAR.

## 2020-12-05 NOTE — PROCEDURES
Interventional Radiology Brief Procedure Note    Patient: Heide Curiel MRN: [id number]  SSN: [id number]    YOB: 1955  Age: 76 y.o. Sex: female      Date of Procedure: 12/5/2020    Pre-Procedure Diagnosis: SORAYA      Post-Procedure Diagnosis: SAME    Procedure(s): Temporary Dialysis Venous Catheter    Brief Description of Procedure: Bedside US guided temp dialysis cath placement    Performed By: Santosh Saunders DO     Assistants: LAURIE Finley    Anesthesia: Lidocaine    Estimated Blood Loss: Less than 10ml    Specimens: None    Implants: Temporary Hemodialysis Catheter    Findings: Widely patent RIJV     Complications: None

## 2020-12-05 NOTE — PROGRESS NOTES
Patient became hypotensive and Dr. Merlin Posner was notified. New order for Levophed was given. Levophed was started in left upper peripheral IV at 2001. At 2020 the IV site receiving Levophed became swollen, red and cool to the touch. Levophed was immediately stopped and switched to another site. Infiltrated IV site line was aspirated receiving 2 ml of clear fluid. Dr. Hu Christina was called and gave orders for Regitine. IV was removed from site and Regitine was administered at 2054. Patient tolerated the procedure well. At this time the brachial and radial pulses are present. The skin is swollen, pink and cool to the touch at the infiltrated IV site. Skin is intact. Extremity is elevated on a pillow. Affected area has been circled with a marker. Will continue to monitor the area.

## 2020-12-05 NOTE — PROGRESS NOTES
Comprehensive Nutrition Assessment    Type and Reason for Visit: Initial(NST)    Nutrition Recommendations/Plan:   As NG placed and available for use, initiate TF of Nepro at 30mL/hr  Increase by 20mL q4hr to goal rate 53mL/hr, continuous  Flush with 100mL free water q4hr OR Flushes per MD  Goal feeds provide 2290kcal, 103g protein, 1525mL fluid (104%kcal, 100%pro, 97%fluid needs)    Document TF rate, water flushes, GRVs, and BMs in EMR    Nutrition Assessment:  Pt poorly responsive pta. CT head on admit without acute findings. Dx SORAYA. Admitted to ICU for hypotension. Currently on 2x pressors. Per Nephrologist, likely to start CRRT- noted third spacing. Alertness somewhat improving. NG placement ordered, Nepro ordered per MD- RD to update orders. Cardiac diet ordered with no PO intakes 2/2 lethargy. Labs: H/H 9,6/31.4, BUN 43, Cr 2.88, . Meds: LR, norepi, phenylepi, insulin, solumedrol, zosyn. Malnutrition Assessment:  Malnutrition Status:  No malnutrition    Context:  Acute illness     Findings of the 6 clinical characteristics of malnutrition:   Energy Intake:  Mild decrease in energy intake (specify)(No PO x24 hours)  Weight Loss:  Unable to assess     Body Fat Loss:  No significant body fat loss,     Muscle Mass Loss:  No significant muscle mass loss,    Fluid Accumulation:  No significant fluid accumulation,        Estimated Daily Nutrient Needs:  Energy (kcal): 2200kcal (28kcal/kg)(?CRRT); Weight Used for Energy Requirements: Current  Protein (g): 102g (1.3g/kg)(CRRT vs obesity); Weight Used for Protein Requirements: Current  Fluid (ml/day): 1575mL (20mL/kg); Method Used for Fluid Requirements: ml/kg      Nutrition Related Findings:  NFPE without acute findings, however difficult to assess true wasting 2/2 edema. 3+ generalized edema. No BM since admit. No documented hx dysphagia, n/v, or c/d.       Wounds:    Skin tears(gluteal cleft)       Current Nutrition Therapies:  DIET CARDIAC Regular  DIET TUBE FEEDING Nepro       Anthropometric Measures:  · Height:  5' 2\" (157.5 cm)  · Current Body Wt:  78.7 kg (173 lb 8 oz)(12/4)   · Ideal Body Wt:  110 lbs:  157.7 %   · Adjusted Body Weight: (57.2kg)   · BMI Category:  Obese class 1 (BMI 30.0-34.9)     Unable to obtain wt hx. Nutrition Diagnosis:   · Inadequate oral intake related to cognitive or neurological impairment(AMS) as evidenced by intake 0-25%(NG placed for TF)      Nutrition Interventions:   Food and/or Nutrient Delivery: Modify current diet, Start tube feeding  Nutrition Education and Counseling: No recommendations at this time  Coordination of Nutrition Care: Continue to monitor while inpatient    Goals:  Initiate means of nutrition to meet >75% EENs in 7 days  Maintain skin integrity  Wt maintenance +/- 0.5kg per week while acutely ill       Nutrition Monitoring and Evaluation:   Behavioral-Environmental Outcomes: None identified  Food/Nutrient Intake Outcomes: Enteral nutrition intake/tolerance  Physical Signs/Symptoms Outcomes: Weight, Skin, Biochemical data, Hemodynamic status, Chewing or swallowing    Discharge Planning:     Too soon to determine     Electronically signed by Bry Zheng on 12/5/2020 at 11:50 AM    Contact:

## 2020-12-05 NOTE — CONSULTS
Consult    NAME: Zac Amaya   :  1955   MRN:  [id number]     Date/Time:  2020 10:32 AM    Patient PCP: Debra Bedolla MD  ________________________________________________________________________     Problem List:   -Sepsis  -Hypotension  -Sarcoidosis  -COPD          Assessment/Plan:   -79-year-old -American female admitted to the hospital with shortness of breath and altered mental status.  -At the time of my examination patient did not respond to vocal command appropriately and was unable to engage with me in verbal communication to obtain history.  -No known established coronary artery disease and no recent cardiac work-up is available to me in our epic EMR system at this time.  -We will check serial cardiac enzymes continue to monitor her on the monitor and obtain an echocardiogram and then make further cardiac management decision as clinically warranted.  -We will follow while patient is in the hospital along with the team.    Thank you for consultation and letting me participate in the care of your patient. []        High complexity decision making was performed      Patient Active Problem List   Diagnosis Code    AMS (altered mental status) R41.82        Subjective:   CHIEF COMPLAINT:     HISTORY OF PRESENT ILLNESS:     Leidy Larios is a 76 y.o.  female who was admitted to the hospital for altered mental status and shortness of breath. Cardiology consult was invited secondary to tachycardia and shortness of breath. When I saw the patient she was lying comfortably in the bed did not respond to vocal command and was unable to engage with me in verbal communication. History is obtained from the staff primary care physician and by review the chart. Cardiac enzyme is unremarkable and monitor shows sinus tachycardia. Once it is doable we will obtain an echocardiogram for further cardiac assessment and management as clinically warranted.       We were asked to consult for work up and evaluation of the above problems. Past Medical History:   Diagnosis Date    Chronic obstructive pulmonary disease ([facility] [address]     Hypertension     Sarcoidosis       No past surgical history on file. No Known Allergies   Meds:  See below  Social History     Tobacco Use    Smoking status: Never Smoker    Smokeless tobacco: Never Used   Substance Use Topics    Alcohol use: No      No family history on file. REVIEW OF SYSTEMS:     []         Unable to obtain  ROS due to ---   [x]         Total of 12 systems reviewed as follows:    Constitutional: negative fever, negative chills, negative weight loss  Eyes:   negative visual changes  ENT:   negative sore throat, tongue or lip swelling  Respiratory:  negative cough, negative dyspnea  Cards:  negative for chest pain, palpitations, lower extremity edema  GI:   negative for nausea, vomiting, diarrhea, and abdominal pain  Genitourinary: negative for frequency, dysuria  Integument:  negative for rash   Hematologic:  negative for easy bruising and gum/nose bleeding  Musculoskel: negative for myalgias,  back pain  Neurological:  negative for headaches, dizziness, vertigo, weakness  Behavl/Psych: negative for feelings of anxiety, depression     Pertinent Positives include :    Objective:      Physical Exam:    Last 24hrs VS reviewed since prior progress note. Most recent are:    Visit Vitals  BP (!) 79/57 (BP 1 Location: Left leg, BP Patient Position: At rest;Head of bed elevated (Comment degrees)) Comment (BP Patient Position): 30   Pulse (!) 112   Temp 98.3 °F (36.8 °C)   Resp 23   Ht 5' 2\" (1.575 m)   Wt 78.7 kg (173 lb 8 oz)   SpO2 98%   BMI 31.73 kg/m²       Intake/Output Summary (Last 24 hours) at 12/5/2020 1032  Last data filed at 12/4/2020 2300  Gross per 24 hour   Intake    Output 30 ml   Net -30 ml        General Appearance: Well developed, well nourished, alert & oriented x 3,    no acute distress.   Ears/Nose/Mouth/Throat: Pupils equal and round, Hearing grossly normal.  Neck: Supple. JVP within normal limits. Carotids good upstrokes, with no bruit. Chest: Lungs clear to auscultation bilaterally. Cardiovascular: Regular rate and rhythm, S1S2 normal, no murmur, rubs, gallops. Abdomen: Soft, non-tender, bowel sounds are active. No organomegaly. Extremities: Trace pedal edema. Both legs are swollen with trace pitting pedal edema. .  Skin: Warm and dry. Neuro: Deferred  []         Post-cath site without hematoma, bruit, tenderness, or thrill. Distal pulses intact. XR CHEST PORT   Final Result   FINDINGS/IMPRESSION:   Radiograph is limited by patient's body habitus and AP portable technique. Support catheter right-sided approach, distal aspect projects over the right   atrium. Negative for pneumothorax. Calcified mediastinal and hilar lymph nodes consistent with sequela of prior   granulomatous insult. Reticular opacity present throughout the lungs consistent with pre-existing   interstitial lung disease. CT HEAD WO CONT   Final Result   Impression: Unremarkable head CT without contrast.  No infarct, mass, or    hemorrhage. No skull fracture or traumatic soft tissue swelling. Please see full   report. XR HAND LT MIN 3 V   Final Result   IMPRESSION: Advanced osteoarthritis. XR CHEST PORT   Final Result      XR CHEST PORT    (Results Pending)        Data:      Telemetry:    EKG:  []  No new EKG for review. Prior to Admission medications    Medication Sig Start Date End Date Taking? Authorizing Provider   fluticasone propion-salmeteroL (Advair Diskus) 250-50 mcg/dose diskus inhaler Take 1 Puff by inhalation every twelve (12) hours. Yes Other, MD Estelle   albuterol (PROVENTIL VENTOLIN) 2.5 mg /3 mL (0.083 %) nebu by Nebulization route. Yes Other, MD Estelle   allopurinoL (ZYLOPRIM) 100 mg tablet Take  by mouth daily.    Yes Other, MD Estelle   saliva substitution (Biotene Dry Mouth Oral Rinse) mwsh mouthwash 15 mL by Mucous Membrane route as needed. Yes Other, MD Estelle   bisacodyL (DULCOLAX) 5 mg EC tablet Take 5 mg by mouth daily as needed for Constipation. Yes Yolande, MD Estelle   capsaicin (CAPZASIN-HP) 0.1 % topical cream Apply  to affected area three (3) times daily. Yes Other, MD Estelle   cholecalciferol (VITAMIN D3) 25 mcg (1,000 unit) cap Take  by mouth daily. Yes Yolande, MD Estelle   ipratropium-albuteroL (Combivent Respimat)  mcg/actuation inhaler Take 1 Puff by inhalation every six (6) hours. Yes Yolande, MD Estelle   rosuvastatin (Crestor) 20 mg tablet Take 20 mg by mouth nightly. Yes Estelle Lanier MD   folic acid (FOLVITE) 1 mg tablet Take  by mouth daily. Yes Other, MD Estelle   alendronate (Fosamax) 70 mg tablet Take  by mouth. Yes Other, MD Estelle   gabapentin (NEURONTIN) 300 mg capsule Take 300 mg by mouth three (3) times daily. Yes Other, MD Estelle   metoprolol tartrate (LOPRESSOR) 25 mg tablet Take  by mouth two (2) times a day. Yes Estelle Lanier MD   multivitamin (ONE A DAY) tablet Take 1 Tab by mouth daily. Yes Other, MD Estelle   nystatin (MYCOSTATIN) powder Apply  to affected area four (4) times daily. Yes Other, MD Estelle   oxyCODONE-acetaminophen (PERCOCET) 5-325 mg per tablet Take  by mouth every four (4) hours as needed for Pain. Yes Other, MD Estelle   PARoxetine (PaxiL) 20 mg tablet Take  by mouth daily. Yes Other, MD Estelle   pantoprazole (Protonix) 40 mg tablet Take 40 mg by mouth daily. Yes Other, MD Estelle   montelukast (Singulair) 10 mg tablet Take 10 mg by mouth daily. Yes Estelle Lanier MD   tamsulosin (FLOMAX) 0.4 mg capsule Take 0.4 mg by mouth daily. Yes OtherEstelle MD   tiZANidine (ZANAFLEX) 2 mg tablet Take  by mouth. Yes Yolande, MD Estelle   predniSONE (DELTASONE) 20 mg tablet Take  by mouth daily (with breakfast). Yes Provider, Historical   budesonide-formoterol (SYMBICORT) 160-4.5 mcg/actuation HFA inhaler Take 2 Puffs by inhalation two (2) times a day. Provider, Historical   denosumab (PROLIA) 60 mg/mL injection 60 mg by SubCUTAneous route. Provider, Historical       Recent Results (from the past 24 hour(s))   CBC WITH AUTOMATED DIFF    Collection Time: 12/04/20 12:30 PM   Result Value Ref Range    WBC 10.5 3.6 - 11.0 K/uL    RBC 3.02 (L) 3.80 - 5.20 M/uL    HGB 9.2 (L) 11.5 - 16.0 g/dL    HCT 30.2 (L) 35.0 - 47.0 %    .0 (H) 80.0 - 99.0 FL    MCH 30.5 26.0 - 34.0 PG    MCHC 30.5 30.0 - 36.5 g/dL    RDW 20.1 (H) 11.5 - 14.5 %    PLATELET 416 668 - 142 K/uL    MPV 11.4 8.9 - 12.9 FL    NRBC 0.7 (H) 0  WBC    ABSOLUTE NRBC 0.07 (H) 0.00 - 0.01 K/uL    NEUTROPHILS 67 32 - 75 %    BAND NEUTROPHILS 5 0 - 6 %    LYMPHOCYTES 9 (L) 12 - 49 %    MONOCYTES 14 (H) 5 - 13 %    EOSINOPHILS 1 0 - 7 %    BASOPHILS 0 0 - 1 %    METAMYELOCYTES 1 (H) 0 %    MYELOCYTES 3 (H) 0 %    IMMATURE GRANULOCYTES 0 %    ABS. NEUTROPHILS 7.6 1.8 - 8.0 K/UL    ABS. LYMPHOCYTES 0.9 0.8 - 3.5 K/UL    ABS. MONOCYTES 1.5 (H) 0.0 - 1.0 K/UL    ABS. EOSINOPHILS 0.1 0.0 - 0.4 K/UL    ABS. BASOPHILS 0.0 0.0 - 0.1 K/UL    ABS. IMM. GRANS. 0.0 K/UL    DF Manual      RBC COMMENTS Microcytosis  1+       METABOLIC PANEL, COMPREHENSIVE    Collection Time: 12/04/20 12:30 PM   Result Value Ref Range    Sodium 137 136 - 145 mmol/L    Potassium Hemolyzed, recollect requested 3.5 - 5.1 mmol/L    Chloride 105 97 - 108 mmol/L    CO2 23 21 - 32 mmol/L    Anion gap 9 5 - 15 mmol/L    Glucose 405 (H) 65 - 100 mg/dL    BUN 45 (H) 6 - 20 mg/dL    Creatinine 2.76 (H) 0.55 - 1.02 mg/dL    BUN/Creatinine ratio 16 12 - 20      GFR est AA 21 (L) >60 ml/min/1.73m2    GFR est non-AA 17 (L) >60 ml/min/1.73m2    Calcium 8.3 (L) 8.5 - 10.1 mg/dL    Bilirubin, total 0.7 0.2 - 1.0 mg/dL    AST (SGOT) Hemolyzed, recollect requested 15 - 37 U/L    ALT (SGPT) 48 12 - 78 U/L    Alk.  phosphatase 154 (H) 45 - 117 U/L    Protein, total 5.5 (L) 6.4 - 8.2 g/dL    Albumin 1.8 (L) 3.5 - 5.0 g/dL    Globulin 3.7 2.0 - 4.0 g/dL A-G Ratio 0.5 (L) 1.1 - 2.2     BNP    Collection Time: 12/04/20 12:30 PM   Result Value Ref Range    NT pro- (H) <125 pg/mL   TROPONIN I    Collection Time: 12/04/20 12:30 PM   Result Value Ref Range    Troponin-I, Qt. <0.05 <0.05 ng/mL   CULTURE, BLOOD    Collection Time: 12/04/20 12:30 PM    Specimen: Blood   Result Value Ref Range    Special Requests: No Special Requests      Culture result: No growth after 16 hours     LACTIC ACID    Collection Time: 12/04/20 12:30 PM   Result Value Ref Range    Lactic acid 2.0 0.4 - 2.0 mmol/L   CULTURE, BLOOD    Collection Time: 12/04/20 12:30 PM    Specimen: Blood   Result Value Ref Range    Special Requests: No Special Requests      Culture result: No growth after 16 hours     URINALYSIS W/MICROSCOPIC    Collection Time: 12/04/20 12:30 PM   Result Value Ref Range    Color Yellow/Straw      Appearance Turbid (A) Clear      Specific gravity 1.009 1.003 - 1.030      pH (UA) 5.0 5.0 - 8.0      Protein 100 (A) Negative mg/dL    Glucose >300 (A) Negative mg/dL    Ketone Negative Negative mg/dL    Bilirubin Negative Negative      Blood Moderate (A) Negative      Urobilinogen 0.1 0.1 - 1.0 EU/dL    Nitrites Negative Negative      Leukocyte Esterase Moderate (A) Negative      WBC >100 (H) 0 - 4 /hpf    RBC >100 (H) 0 - 5 /hpf    Bacteria 4+ (A) Negative /hpf   INFLUENZA A & B AG (RAPID TEST)    Collection Time: 12/04/20  2:00 PM   Result Value Ref Range    Influenza A Antigen Negative Negative      Influenza B Antigen Negative Negative     SARS-COV-2    Collection Time: 12/04/20  2:28 PM   Result Value Ref Range    SARS-CoV-2 Please find results under separate order     COVID-19 RAPID TEST    Collection Time: 12/04/20  2:28 PM   Result Value Ref Range    Specimen source Nasopharyngeal      COVID-19 rapid test Not Detected Not Detected     BLOOD GAS, ARTERIAL    Collection Time: 12/04/20  2:51 PM   Result Value Ref Range    pH 7.19 (LL) 7.35 - 7.45      PCO2 27 (L) 35 - 45 mmHg    PO2 20 (LL) 75 - 100 mmHg    O2 SAT 27 (LL) >95 %    BICARBONATE 11 (L) 22 - 26 mmol/L    BASE DEFICIT 16.2 (H) 0 - 2 mmol/L    O2 FLOW RATE 4 L/min    FIO2 36 %    Critical value read back APRIL COST RN    DRUG SCREEN, URINE    Collection Time: 12/04/20  3:15 PM   Result Value Ref Range    AMPHETAMINES Negative Negative      BARBITURATES Negative Negative      BENZODIAZEPINES Negative Negative      COCAINE Negative Negative      METHADONE Negative Negative      OPIATES Negative Negative      PCP(PHENCYCLIDINE) Negative Negative      THC (TH-CANNABINOL) Negative Negative      Drug screen comment        This test is a screen for drugs of abuse in a medical setting only (i.e., they are unconfirmed results and as such must not be used for non-medical purposes, e.g.,employment testing, legal testing). Due to its inherent nature, false positive (FP) and false negative (FN) results may be obtained. Therefore, if necessary for medical care, recommend confirmation of positive findings by GC/MS.    BLOOD GAS, ARTERIAL    Collection Time: 12/04/20  3:30 PM   Result Value Ref Range    pH 7.302 (L) 7.35 - 7.45      PCO2 39 35 - 45 mmHg    PO2 134 (H) 75 - 100 mmHg    O2  >95 %    BICARBONATE 19 (L) 22 - 26 mmol/L    BASE DEFICIT 6.6 (H) 0 - 2 mmol/L    O2 METHOD Nasal Cannula      O2 FLOW RATE 2.0 L/min    SITE Left Brachial     MRSA SCREEN - PCR (NASAL)    Collection Time: 12/04/20  6:50 PM   Result Value Ref Range    MRSA by PCR, Nasal DETECTED (A) Not Detected     GLUCOSE, POC    Collection Time: 12/04/20 10:21 PM   Result Value Ref Range    Glucose (POC) 279 (H) 65 - 100 mg/dL    Performed by [facility]    METABOLIC PANEL, COMPREHENSIVE    Collection Time: 12/05/20  4:30 AM   Result Value Ref Range    Sodium 141 136 - 145 mmol/L    Potassium 4.3 3.5 - 5.1 mmol/L    Chloride 112 (H) 97 - 108 mmol/L    CO2 19 (L) 21 - 32 mmol/L    Anion gap 10 5 - 15 mmol/L    Glucose 174 (H) 65 - 100 mg/dL    BUN 43 (H) 6 - 20 mg/dL    Creatinine 2.88 (H) 0.55 - 1.02 mg/dL    BUN/Creatinine ratio 15 12 - 20      GFR est AA 20 (L) >60 ml/min/1.73m2    GFR est non-AA 16 (L) >60 ml/min/1.73m2    Calcium 7.5 (L) 8.5 - 10.1 mg/dL    Bilirubin, total 0.5 0.2 - 1.0 mg/dL    AST (SGOT) 40 (H) 15 - 37 U/L    ALT (SGPT) 46 12 - 78 U/L    Alk. phosphatase 155 (H) 45 - 117 U/L    Protein, total 5.6 (L) 6.4 - 8.2 g/dL    Albumin 1.5 (L) 3.5 - 5.0 g/dL    Globulin 4.1 (H) 2.0 - 4.0 g/dL    A-G Ratio 0.4 (L) 1.1 - 2.2     CBC WITH AUTOMATED DIFF    Collection Time: 12/05/20  4:30 AM   Result Value Ref Range    WBC 5.2 3.6 - 11.0 K/uL    RBC 3.20 (L) 3.80 - 5.20 M/uL    HGB 9.6 (L) 11.5 - 16.0 g/dL    HCT 31.4 (L) 35.0 - 47.0 %    MCV 98.1 80.0 - 99.0 FL    MCH 30.0 26.0 - 34.0 PG    MCHC 30.6 30.0 - 36.5 g/dL    RDW 19.8 (H) 11.5 - 14.5 %    PLATELET 691 420 - 638 K/uL    MPV 9.4 8.9 - 12.9 FL    NRBC 5.4 (H) 0  WBC    ABSOLUTE NRBC 0.28 (H) 0.00 - 0.01 K/uL    NEUTROPHILS 74 32 - 75 %    LYMPHOCYTES 21 12 - 49 %    MONOCYTES 3 (L) 5 - 13 %    EOSINOPHILS 0 0 - 7 %    BASOPHILS 2 (H) 0 - 1 %    IMMATURE GRANULOCYTES 0 %    ABS. NEUTROPHILS 3.8 1.8 - 8.0 K/UL    ABS. LYMPHOCYTES 1.1 0.8 - 3.5 K/UL    ABS. MONOCYTES 0.2 0.0 - 1.0 K/UL    ABS. EOSINOPHILS 0.0 0.0 - 0.4 K/UL    ABS. BASOPHILS 0.1 0.0 - 0.1 K/UL    ABS. IMM.  GRANS. 0.0 K/UL    DF AUTOMATED      RBC COMMENTS Normocytic, Normochromic     GLUCOSE, POC    Collection Time: 12/05/20  7:10 AM   Result Value Ref Range    Glucose (POC) 151 (H) 65 - 100 mg/dL    Performed by Joy Bocanegra MD

## 2020-12-05 NOTE — PROGRESS NOTES
Progress Note    Patient: Boston Sesay MRN: [id number]  SSN: [id number]    YOB: 1955  Age: 76 y.o. Sex: female      Admit Date: 12/4/2020    LOS: 1 day     Subjective:   Patient followed for possible septic shock with urinalysis suggesting UTI. Blood cultures negative so far and urine culture pending. Patient admitted to ICU on vasopressors. Currently on Vancomycin and Zosyn. Tested negative for Covid-19. She is somnolent but appears to be resting comfortably. Objective:     Vitals:    12/05/20 0830 12/05/20 0840 12/05/20 0942 12/05/20 1000   BP: 90/74 (!) 70/50 (!) 79/57 (!) 81/56   Pulse: (!) 110 (!) 108 (!) 112 (!) 109   Resp:   23 22   Temp:       SpO2:   98% 93%   Weight:       Height:            Intake and Output:  Current Shift: No intake/output data recorded. Last three shifts: 12/03 1901 - 12/05 0700  In: -   Out: 30 [Urine:30]    Physical Exam:   Vitals signs and nursing note reviewed. Constitutional:  On Levophed 8 mcg/min     General: She is not in acute distress. Appearance: She is obese. She is ill-appearing. She is not diaphoretic. HENT: eyes closed  Comments: Nasal O2 cannula  Eyes: closed   Neck: right sided CVC     Musculoskeletal: Neck supple. Cardiovascular:      Rate and Rhythm: Normal rate and regular rhythm. Heart sounds: No murmur. Pulmonary:      Breath sounds: Rales (left lung field) present. No rhonchi. Abdominal:      Palpations: Abdomen is soft. Tenderness: There is no abdominal tenderness. There is no right CVA tenderness or left CVA tenderness. Genitourinary:     Comments: Costa catheter  Musculoskeletal:         General: No tenderness. Right lower leg: No edema. Left lower leg: No edema. Skin: ?mottling     Findings: No rash. Neurological:      General: No focal deficit present. Mental Status: She is alert. She is disoriented.    Psychiatric:      Comments: Unable to assess      Lab/Data Review:     WBC 5,200  Procalcitonin Pending  CRP Pending    Covid-19 Not detected    Blood cultures (12/4) No growth at 1 day  Blood cultures (12/4) No growth at 1 day  Urine culture (12/4) Pending  Assessment:     Active Problems:    AMS (altered mental status) (12/4/2020)    1. Presumptive septic shock with unexplained hypotension, possible urosepsis. 2. Possible UTI with marked pyuria and bacteria, urine culture pending. 3. Altered mental status, etiology unclear  3. Covid-19 negative    Comment:  Given urinalysis, high index of suspicion for urosepsis. Plan:   1. Continue IV Zosyn and Vancomycin  2. Follow-up blood and urine cultures  3. In am, repeat CRP and procalcitonin  4.  Continue vasopressor support       Signed By: Vandana Wagner MD     December 5, 2020

## 2020-12-05 NOTE — CONSULTS
PULMONARY ICU CONSULT  [address] [facility]    Name: Camille Maharaj MRN: [id number]   : 1955 Hospital: [address] [place] [address]   Date: 2020  Admission date: 2020 Hospital Day: 2       HPI:     Hospital Problems  Date Reviewed: 2017          Codes Class Noted POA    AMS (altered mental status) ICD-10-CM: R41.82  ICD-9-CM: 780.97  2020 Unknown                   [x] High complexity decision making was performed  [x] See my orders for details      Subjective/Initial History:     I was asked by Deric Mccall MD to see Camille Maharaj  a 76 y.o.  female in consultation     Excerpts from admission 2020 or consult notes as follows:   58-year-old lady came in because of shortness of breath and also having altered mental status past medical history of COPD hypertension sarcoidosis she was at nursing home starting having confusion spell noticed by the nurse and she was hypotensive hemodynamically unstable received IV fluid in the emergency room but her condition did not get stabilized transferred to ICU she was put on vasopressors unable to get much history out of the patient so admitted and critical care consult was called for further evaluation.       No Known Allergies     MAR reviewed and pertinent medications noted or modified as needed     Current Facility-Administered Medications   Medication    PHENYLephrine (RENETTA-SYNEPHRINE) 30 mg in 0.9% sodium chloride 250 mL infusion    sodium chloride (NS) flush 5-10 mL    pantoprazole (PROTONIX) tablet 40 mg    PARoxetine (PAXIL) tablet 10 mg    tamsulosin (FLOMAX) capsule 0.4 mg    0.9% sodium chloride infusion    acetaminophen (TYLENOL) tablet 650 mg    Or    acetaminophen (TYLENOL) suppository 650 mg    polyethylene glycol (MIRALAX) packet 17 g    ondansetron (ZOFRAN ODT) tablet 4 mg    Or    ondansetron (ZOFRAN) injection 4 mg    heparin (porcine) injection 5,000 Units    insulin lispro (HUMALOG) injection  glucose chewable tablet 16 g    dextrose (D50W) injection syrg 12.5-25 g    glucagon (GLUCAGEN) injection 1 mg    piperacillin-tazobactam (ZOSYN) 3.375 g in 0.9% sodium chloride (MBP/ADV) 100 mL MBP    vancomycin RX Dosing information     Vancomycin random level to be done on 2020 at 0600 am.    NOREPINephrine (LEVOPHED) 8 mg in 5% dextrose 250mL (32 mcg/mL) infusion      Patient PCP: Toñito Daniel MD  PMH:  has a past medical history of Chronic obstructive pulmonary disease ([address] Hypertension, and Sarcoidosis. PSH:   has no past surgical history on file. FHX: family history is not on file. SHX:  reports that she has never smoked. She has never used smokeless tobacco. She reports that she does not drink alcohol or use drugs. ROS:  Unable to obtain      Objective:     Vital Signs: Telemetry:    normal sinus rhythm Intake/Output:   Visit Vitals  BP (!) 70/50 (BP 1 Location: Left leg, BP Patient Position: At rest;Supine)   Pulse (!) 108   Temp 98.3 °F (36.8 °C)   Resp 21   Ht 5' 2\" (1.575 m)   Wt 78.7 kg (173 lb 8 oz)   SpO2 100%   BMI 31.73 kg/m²       Temp (24hrs), Av.8 °F (37.1 °C), Min:98.2 °F (36.8 °C), Max:99.8 °F (37.7 °C)        O2 Device: Nasal cannula O2 Flow Rate (L/min): 3 l/min       Wt Readings from Last 4 Encounters:   20 78.7 kg (173 lb 8 oz)          Intake/Output Summary (Last 24 hours) at 2020 0928  Last data filed at 2020 2300  Gross per 24 hour   Intake    Output 30 ml   Net -30 ml       Last shift:      No intake/output data recorded. Last 3 shifts:  1901 -  0700  In: -   Out: 30 [Urine:30]       Physical Exam:     Physical Exam   Constitutional: She appears distressed. HENT:   Head: Normocephalic and atraumatic. Eyes: Pupils are equal, round, and reactive to light. Conjunctivae are normal.   Neck: Normal range of motion. Neck supple. Cardiovascular: Normal rate and regular rhythm.    Pulmonary/Chest: She is in respiratory distress. She has rales. Abdominal: Soft. Bowel sounds are normal.   Skin: Skin is warm and dry. Labs:    Recent Labs     12/05/20  0430 12/04/20  1230   WBC 5.2 10.5   HGB 9.6* 9.2*    179     Recent Labs     12/05/20  0430 12/04/20  1230    137   K 4.3 Hemolyzed, recollect requested   * 105   CO2 19* 23   * 405*   BUN 43* 45*   CREA 2.88* 2.76*   CA 7.5* 8.3*   LAC  --  2.0   ALB 1.5* 1.8*   ALT 46 48     Recent Labs     12/04/20  1530 12/04/20  1451   PH 7.302* 7.19*   PCO2 39 27*   PO2 134* 20*   HCO3 19* 11*   FIO2  --  36     Recent Labs     12/04/20  1230   TROIQ <0.05     No results found for: BNPP, BNP   Lab Results   Component Value Date/Time    Culture result: No growth after 16 hours 12/04/2020 12:30 PM    Culture result: No growth after 16 hours 12/04/2020 12:30 PM   No results found for: TSH, TSHEXT    Imaging:    CXR Results  (Last 48 hours)               12/04/20 1241  XR CHEST PORT Final result    Narrative:  Chest single view. Reduced lung volumes. Nonspecific coarse reticular markings through the lungs,   upper lobe lung predominance; suspect fibrosis. Cardiac and mediastinal   structures magnified on this AP view. Calcified mediastinal lymph nodes present. No pneumothorax or pleural effusion. Results from [place] encounter on 12/04/20   XR HAND LT MIN 3 V    Narrative Left hand, 3 views    Significant joint space narrowing; predominant wrist and interphalangeal joints. First carpal-metacarpal joint and DIP joint index finger with mild subluxation. No fracture evident. Impression IMPRESSION: Advanced osteoarthritis. XR CHEST PORT    Narrative Chest single view. Reduced lung volumes. Nonspecific coarse reticular markings through the lungs,  upper lobe lung predominance; suspect fibrosis. Cardiac and mediastinal  structures magnified on this AP view. Calcified mediastinal lymph nodes present.   No pneumothorax or pleural effusion. Results from [place] encounter on 12/04/20   CT HEAD WO CONT    Narrative History is altered mental status. Comparison is with the brain MRI of 7/15/2020. TECHNIQUE: Serial axial images were obtained through the brain at 5 mm intervals  without contrast administration. Bone and brain windows are evaluated as well as  sagittal and coronal reformatted images. Dose reduction:  All CT scans at this facility are performed using dose  reduction optimization techniques as appropriate to a performed exam including  the following: automated exposure control, adjustments of the mA and/or kV  according to patient size, or use of iterative reconstruction technique. Findings: The ventricles are midline without extrinsic compression or  dilatation. There is no intra or extra-axial hemorrhage, mass, infarct or edema. No midline shift is identified. The orbits and their contents appear  unremarkable, except the native lenses have been replaced. On bone windows there is no skull fracture or soft tissue swelling. There are  soft tissue excrescences from the right lateral and posterior scalp. These are  unchanged from the prior exam. No sinusitis or mastoiditis is identified. Impression Impression: Unremarkable head CT without contrast.  No infarct, mass, or   hemorrhage. No skull fracture or traumatic soft tissue swelling. Please see full  report. IMPRESSION:   1. Acute hypoxic respiratory failure  2. Severe sepsis with septic shock  3. Sarcoidosis by history  4. Chronic Obstructive Pulmonary Disease with Severe Acute Exacerbation requiring inpatient hospitalization and management; has very poor airway clearance. Increased work of breathing  5. Acute kidney injury  6. Functional quadriplegic  7.  Multiorgan dysfunction as outlined above: Pt has one or more acute or chronic illnesses with severe exacerbation with progression or side effects of treatment that poses a threat to life or bodily function  8. Additional workup outlined below  9. Pt is requiring Drug therapy requiring intensive monitoring for toxicity  10. Pt is unstable, unpredictable needing inpatient monitoring; is acutely ill and at high risk of sudden decline and decompensation with severe consequenses and continued end organ dysfunction and failure  11. Prognosis guarded       RECOMMENDATIONS/PLAN:     1. We will start patient on Solu-Medrol 3 doses only, this x-ray shows chronic changes mostly upper lobe with possible calcification of the lymph nodes secondary to sarcoidosis  2. Oxygen per protocol concentration of oxygen to overcome refractory hypoxia;  3. Intubate and place on vent if NIV fails  4. Agree with Empiric IV antibiotics pending culture results   5. Follow culture results  6. Supplemental O2 to keep sats > 93%  7. Aspiration precautions  8. Labs to follow electrolytes, renal function and and blood counts  9. Glucose monitoring and SSI  10. Bronchial hygiene with respiratory therapy techniques, bronchodilators  11.  DVT, SUP prophylaxis       This care involved high complexity medical decision making: I personally:  · Reviewed the flowsheet and previous days notes  · Reviewed and summarized records or history from previous days note or discussions with staff, family  · High Risk Drug therapy requiring intensive monitoring for toxicity: eg steroids, pressors, antibiotics  · Reviewed and/or ordered Clinical lab tests  · Reviewed images and/or ordered Radiology tests  · Reviewed the patients ECG / Telemetry  · Reviewed and/or adjusted NiPPV settings  · discussed my assessment/management with : Nursing, Hospitalist and Family for coordination of care                     Time spent in ICU 55 min      Edilberto Christiansen MD

## 2020-12-05 NOTE — PROGRESS NOTES
Patient will be transferred to [facility] for CRRT. In [facility] CRRT machine broke. I have called the acute [facility] team Lakesha Lopez) and notified about CVVH order.  I will see the patient in am.    Hannah Verde MD    [facility]

## 2020-12-05 NOTE — PROGRESS NOTES
Patients heart rate is 132. Dr Reed Sanchez notified and he ordered a one time dose of Digoxin and to consult cardiology. Digoxin administered at 2233    At 0000 patients heart is 137. Dr. Reed Sanchez notified and he instructed me to consult cardiology. Dr Marivel Sagastume was called and he ordered me to decrease the Levophed, start Phenlylephrine, and to order a ECHO.

## 2020-12-06 PROBLEM — N18.9 ACUTE ON CHRONIC RENAL FAILURE (HCC): Status: ACTIVE | Noted: 2020-01-01

## 2020-12-06 PROBLEM — N17.9 ACUTE ON CHRONIC RENAL FAILURE (HCC): Status: ACTIVE | Noted: 2020-01-01

## 2020-12-06 NOTE — PROGRESS NOTES
NEPHROLOGY PROGRESS NOTE    Chief complaints: F/u SORAYA     Subjective: Tolerating CVVH with net UF:25 ml/hr on two pressors.     24 hr interval history:     ROS:unable to obtain    Objective:  Vitals:    12/06/20 0600 12/06/20 0700 12/06/20 0703 12/06/20 0800   BP: (!) 87/54 (!) 99/55  (!) 117/54   Pulse: (!) 111 (!) 107  (!) 108   Resp: 20 17  15   Temp:    (!) 95.7 °F (35.4 °C)   SpO2: 95% 98% 98% 98%   Weight: 83 kg (182 lb 15.7 oz)          Intake/Output Summary (Last 24 hours) at 12/6/2020 0843  Last data filed at 12/6/2020 0800  Gross per 24 hour   Intake 994.02 ml   Output 756 ml   Net 238.02 ml       PHYSICAL EXAM:    GENERAL : Lying down in bed with confused and distress  HEENT: AT NC PEERLA   NECK: Supple no JVP  CVS: S1 S2 RRR, no murmur or gallops heard  RS: diminished BS on both bases  ABDOMEN: soft NT ND positive BS  EXTREMITY: dependent thigh edema and lower ext edema  NEUROLOGY: confused  VASCULAR ACCESS: temp HD caheter    Labs:  CBC:  No results found for: WBC, HGB, HCT, PLT, HGBEXT, HCTEXT, PLTEXT  BMP:   Lab Results   Component Value Date/Time     12/06/2020 02:05 AM    K 5.0 12/06/2020 02:05 AM     (H) 12/06/2020 02:05 AM    CO2 19 (L) 12/06/2020 02:05 AM    AGAP 9 12/06/2020 02:05 AM    GLU 96 12/06/2020 02:05 AM    BUN 43 (H) 12/06/2020 02:05 AM    CREA 2.89 (H) 12/06/2020 02:05 AM    GFRAA 20 (L) 12/06/2020 02:05 AM    GFRNA 16 (L) 12/06/2020 02:05 AM        Lab Results   Component Value Date/Time    Color Yellow/Straw 12/04/2020 12:30 PM    Appearance Turbid (A) 12/04/2020 12:30 PM    Specific gravity 1.009 12/04/2020 12:30 PM    pH (UA) 5.0 12/04/2020 12:30 PM    Protein 100 (A) 12/04/2020 12:30 PM    Glucose >300 (A) 12/04/2020 12:30 PM    Ketone Negative 12/04/2020 12:30 PM    Bilirubin Negative 12/04/2020 12:30 PM    Urobilinogen 0.1 12/04/2020 12:30 PM    Nitrites Negative 12/04/2020 12:30 PM    Leukocyte Esterase Moderate (A) 12/04/2020 12:30 PM    Bacteria 4+ (A) 12/04/2020 12:30 PM    WBC >100 (H) 12/04/2020 12:30 PM    RBC >100 (H) 12/04/2020 12:30 PM       Imaging study:    Assessment &Plan    Acute kidney injury due to ischemic ATN in the setting of hypotension, sepsis  CRRT initiated in the setting of hypervolemia, acidosis   She is tolerating CVVH:Qb:200 ml/hr,RF :25 ml/kg 4K 2.5 CA bath and  net UF:25 ml/hr  I will continue CVVH and change RF 2K bath and increase net UF:50 ml/hr  Renally dose all medication with GFR of 30 or less in the setting of CRRT  Avoid hypotension and nephrotoxic agent  Care plan is discussed with patient daughter Eliceo Khan ([phone]), [facility] nurse Lia Mai and ICU nurse     Hypotension: Due to sepsis  Continue Levophed and vasopressin to keep map above 65     Sepsis: Broad-spectrum antibiotic therapy  Recommended renally dose all medication with current GFR     Metabolic acidosis: Due to acute kidney injury  I will address with CVVH    Total time spent with patient:  35 minutes    [] Critical Care Provided    Care Plan discussed with:   Family, Staff, Medical Team    Nimesh Parham MD  12/6/2020  [address]   [address] [place] [address] [id number]   [facility], [address]  Phone - [phone]  Fax - [phone]

## 2020-12-06 NOTE — H&P
[facility]    ICU Intensivist- Critical Care Progress Note    Name: Leia Vallejo   : 1955   MRN: [id number]   Admit: 2020  9:50 PM      Diagnosis:     Principal Problem:    Septic shock ([facility] [address]    Problem List:  2020: Acute on chronic renal failure ([facility] [address]  2020: Septic shock ([facility] [address]  2020: AMS (altered mental status)        ICU Comprehensive Plan of Care:     Plans for this Shift:   1. Neurological    Metabolic encephalopathy  AMS    -GCS 7, patient had minimal movement to sternal rub  -Continue as needed morphine for pain  -Continue neurochecks  -Patient has metabolic encephalopathy  -We will consult neurology if needed    2. Cardiovascular    CHF  Fluid volume overload  Septic shock    -Patient has life-threatening septic shock and is on IV pressors. Currently on IV norepinephrine drip and Raymond-Synephrine drip. Will start patient on vasopressin if needed and DC Raymond-Synephrine to reduce intravascular volume until CRRT can be started  -Continue to monitor vital signs every 15 minutes while on drips then hourly thereafter  -Continue DVT prophylaxis subcutaneous heparin  -Electrolytes pending  -Last echocardiogram done on 2020 showed EF 54%. Normal systolic function and diastolic function. Mild concentric hypertrophy. Mild to moderate mitral valve regurgitation. Mild to moderate tricuspid valve regurgitation. -BNP 24,018  -Cardiac enzymes 0.05  -CRP 63.10      3. Pulmonary    Acute hypoxic respiratory failure  Community-acquired pneumonia    -Patient has bilateral infiltrates on chest x-ray. Patient appears to be profoundly fluid volume overloaded. -Patient has community-acquired pneumonia. -Goal is maintain oxygen saturation greater than 90%  -Patient needs fluid removal emergently via CRRT  -Continue DuoNeb breathing treatments as needed  -Patient is a DNI/DNR  -Chest x-ray and ABG in a.m.  -Pulmonary edema noted    4.   GI     -We will place Dobbhoff for tube feedings  -Continue as needed Zofran for nausea  -Continue GI prophylaxis Protonix  -Continue bowel regimen    5.     Acute renal failure  Hypocalcemia    -Patient is likely in acute renal failure. Patient has nephrology consult in place for CRRT. Factor 25. Creatinine 2.89, BUN 43.  -Potassium 5.0  -Hypocalcemia noted, calcium level 6.8, IV replacement given  -Magnesium 1.9, IV replacement given  -Nephrology is managing     6. Endocrine     -Unknown if patient has diabetic history. We will continue Accu-Cheks every 4 hours.  -Continues on sliding scale protocol.  -Continue to monitor for signs of hypoglycemia  -Goals maintain blood sugar 110-140    7. Hematology/oncology    Chronic anemia    -Patient has no signs of bleeding  -Patient has chronic anemia secondary to kidney disease  -Hemoglobin 9.6, hematocrit 31.4  -We will transfuse if hemoglobin less than 7  -PT/INR 1.2/12.4    8. ID    Sepsis  Urinary tract infection    -Patient has skin wounds. Patient also has profound urinary tract infection. Procalcitonin elevated. Patient currently on vancomycin/cefepime for IV antibiotic therapy.  -We will de-escalate antibiotics pending cultures  -Sputum culture pending  -Significant urinary tract infection  -WBCs 5.2, no leukocytosis currently  -RDW 19.8  -Patient is from nursing home however recently had Covid test done which was negative. Rapid Covid pending. -CBC in a.m. Subjective:     Progress Note:   12/6/2020   2:59 AM     Reason for ICU Admission:   Septic shock ([facility] [address]   Acute renal failure  CHF  Community-acquired pneumonia  Pulmonary edema  Fluid volume overload  Chronic anemia  Metabolic encephalopathy    HPI: Patient Chato Gonzalez is a 70-year-old female seen and examined today by critical care services due to initial complaints of altered mental status and hypotension. Patient is from a nursing home.   Patient has past medical history for hypertension, renal insufficiency, depression, anxiety, CHF, gout, compression fraction T9-T10, sarcoidosis, hypoxia with respiratory failure, paraplegic, CKD stage III. According to the family the patient has progressively become less interactive and is now obtunded. GCS 7. Family states that the patient's baseline was that she was able to talk and answer questions appropriately. Patient is a paraplegic. Patient was found to have life-threatening hypotension more likely secondary to septic shock. Patient has skin wounds and has a very significant urinary tract infection. Currently no leukocytosis and patient was hypothermic upon admission so she was placed on [employer]. Patient currently on IV norepinephrine drip and IV Raymond-Synephrine drip which we will attempt to wean and place the patient on vasopressin due to her fluid volume overloaded status. Patient also has metabolic acidosis with bicarbonate level 20.3. Patient received 2 A IV sodium bicarbonate. Nephrology has been consulted for management of hemodialysis/CRRT. Patient is currently at a factor of 25. Patient continued on IV vancomycin and IV cefepime for antibiotic therapy. Chest x-ray shows bilateral infiltrates/pulmonary edema/community-acquired pneumonia. Procalcitonin 27.06. Troponin 0 0.05 upon admission. proBNP 24,018. C-reactive protein 63.10. Creatinine 2.89, BUN 43. I had a long discussion with the family regarding CODE STATUS. Patient has 2 daughters Wilbert Kuhn who I spoke with with due to the patient's multiple life-threatening conditions and poor prognosis. After long discussion it was decided that we would not extend to any heroic measures to extend her life should she pass away peacefully. Patient was made a DNR/DNI. Family would like to continue with all other medical treatments including vasopressors, central line placement, dialysis if needed.   If the patient further decompensates the family would be more than willing to progress to comfort measures if needed. Plan is to continue ICU support. Past medical history: Hypertension, renal insufficiency, depression, anxiety, CHF, gout, compression fracture T9-T10, sarcoidosis, hypoxia with respiratory failure, paraplegic, CKD stage III    Past surgical history: No past surgical history per medical records    Past family history: No family history per medical record    Past social history: Patient is from nursing home. Patient is non-smoker, no EtOH use     Past Medical History:      has a past medical history of Chronic obstructive pulmonary disease ([address] Hypertension, and Sarcoidosis. Past Surgical History:      has no past surgical history on file.     Current Medications:     Current Facility-Administered Medications   Medication Dose Route Frequency    sodium chloride (NS) flush 5-40 mL  5-40 mL IntraVENous Q8H    sodium chloride (NS) flush 5-40 mL  5-40 mL IntraVENous PRN    acetaminophen (TYLENOL) tablet 650 mg  650 mg Oral Q6H PRN    Or    acetaminophen (TYLENOL) suppository 650 mg  650 mg Rectal Q6H PRN    ondansetron (ZOFRAN) injection 4 mg  4 mg IntraVENous Q6H PRN    famotidine (PF) (PEPCID) 20 mg in 0.9% sodium chloride 10 mL injection  20 mg IntraVENous Q24H    NOREPINephrine (LEVOPHED) 8 mg in 5% dextrose 250mL (32 mcg/mL) infusion  0.5-30 mcg/min IntraVENous TITRATE    albuterol-ipratropium (DUO-NEB) 2.5 MG-0.5 MG/3 ML  3 mL Nebulization Q4H PRN    cefepime (MAXIPIME) 2 g in 0.9% sodium chloride (MBP/ADV) 100 mL MBP  2 g IntraVENous Q12H    docusate (COLACE) 50 mg/5 mL oral liquid 100 mg  100 mg Oral BID    sennosides (SENOKOT) 8.8 mg/5 mL syrup 8.8 mg  5 mL Oral QPM    morphine injection 2 mg  2 mg IntraVENous Q4H PRN    vancomycin (VANCOCIN) 1,000 mg in 0.9% sodium chloride 250 mL (VIAL-MATE)  1,000 mg IntraVENous Q24H    Vancomycin dosing per pharmacy   Other Rx Dosing/Monitoring    bicarbonate dialysis (PRISMASOL) BG K 4/Ca 2.5 5000 ml solution   Extracorporeal DIALYSIS CONTINUOUS    vasopressin (VASOSTRICT) 20 Units in 0.9% sodium chloride 100 mL infusion  0-0.03 Units/min IntraVENous TITRATE       Allergies/Social/Family History:     No Known Allergies   Social History     Tobacco Use    Smoking status: Never Smoker    Smokeless tobacco: Never Used   Substance Use Topics    Alcohol use: No      No family history on file. Review of Systems:     Review of systems not obtained due to patient factors. Objective:   Vital Signs:  Visit Vitals  BP (!) 110/53   Pulse (!) 112   Temp (!) 96.3 °F (35.7 °C)   Resp 11   SpO2 97%    O2 Flow Rate (L/min): 4 l/min O2 Device: Nasal cannula Temp (24hrs), Av.4 °F (36.3 °C), Min:95.9 °F (35.5 °C), Max:99.5 °F (37.5 °C)           Intake/Output:     Intake/Output Summary (Last 24 hours) at 2020 0259  Last data filed at 2020 0000  Gross per 24 hour   Intake 386.52 ml   Output 0 ml   Net 386.52 ml       Physical Exam:    General:   obtunded,severe, distress, appears stated age. Eyes:   orbits swollen bilaterally, Conjunctivae/corneas clear. PERRL, EOMs intact. Ears:   Normal external ear canals bilaterally. Nose:   No drainage or sinus tenderness. Mouth/Throat:  Moist mucous membranes. Dentition normal.    Neck:  Symmetrical, trachea midline, Yes JVD or carotid bruit. Back:    No CVA tenderness to percussion. Lungs:    ronchi/crackles to auscultation bilaterally. Heart:   Regular rate and rhythm. S1, S2 normal, without murmur, click, rub or gallop. Abdomen:    Normoactive bowel sounds. Soft, non-tender, no masses or organomegaly. Extremities:  Atraumatic, no cyanosis or edema. Vascular:  Pulses 2+ and symmetric UE/LE bilat   Skin:  Normal color, non-diaphoretic, high capillary refill (less than 4 seconds). No rashes or lesions. Lymph nodes:  No Lymphadenopathy. Neurologic:  CN II-XII intact. Normal strength.         LABS AND  DATA: Personally reviewed  Recent Labs     20  0430 20  1230 WBC 5.2 10.5   HGB 9.6* 9.2*   HCT 31.4* 30.2*    179     Recent Labs     12/06/20  0205 12/05/20  0430    141   K 5.0 4.3   * 112*   CO2 19* 19*   BUN 43* 43*   CREA 2.89* 2.88*   GLU 96 174*   CA 6.8* 7.5*   MG 1.9  --    PHOS 5.4*  --      Recent Labs     12/06/20  0205 12/05/20  0430   * 155*   TP 5.7* 5.6*   ALB 2.0* 1.5*   GLOB 3.7 4.1*   *  --    LPSE 54*  --      Recent Labs     12/06/20 0205   INR 1.2*   PTP 12.4*      No results for input(s): PHI, PCO2I, PO2I, FIO2I in the last 72 hours. Recent Labs     12/06/20  0205 12/04/20  1230   TROIQ 0.38* <0.05         Ventilator Settings:  Mode Rate Tidal Volume Pressure FiO2 PEEP                    Peak airway pressure:      Minute ventilation:          MEDS: Reviewed    RADIOLOGY:  Xr Chest Port    Result Date: 12/5/2020  IMPRESSION: Placement of enteric tube with tip overlying the distal stomach otherwise unchanged. Xr Chest Port    Result Date: 12/5/2020  FINDINGS/IMPRESSION: Radiograph is limited by patient's body habitus and AP portable technique. Support catheters right-sided approach, distal aspect of each terminate near the cavoatrial junction. Examination negative for pneumothorax. Remainder stable. Xr Chest Port    Result Date: 12/5/2020  FINDINGS/IMPRESSION: Radiograph is limited by patient's body habitus and AP portable technique. Support catheter right-sided approach, distal aspect projects over the right atrium. Negative for pneumothorax. Calcified mediastinal and hilar lymph nodes consistent with sequela of prior granulomatous insult. Reticular opacity present throughout the lungs consistent with pre-existing interstitial lung disease.           Assessment:     Hospital Problems  Date Reviewed: 5/31/2017          Codes Class Noted POA    Acute on chronic renal failure [facility]) ICD-10-CM: N17.9, N18.9  ICD-9-CM: 584.9, 585.9  12/6/2020 Unknown        Septic shock ([facility] [address] ICD-10-CM: A41.9, R65.21  ICD-9-CM: 038.9, 785.52, 995.92  12/5/2020 Unknown                  Multidisciplinary Rounds Completed:  Pending    ABCDEF Bundle/Checklist  Pain Medications: None  Target RASS: N/A  Sedation Medications: None  CAM-ICU:  n/a  Mobility: Bedrest  PT/OT: n/a   Restraints: None needed at this time  Discussed Plan of Care (goals of care): Yes  Addressed Code Status: DNR/DNI    CARDIOVASCULAR  Cardiac Gtts: Phenylephrine, Norepinephrine and Vasopressin  SBP Goal of: > 90 mmHg  MAP Goal of: > 65 mmHg  Transfusion Trigger (Hgb): <7 g/dL    RESPIRATORY  Vent Goals:   Head of bed > 30 degrees  DVT Prophylaxis (if no, list reason): Heparin   SPO2 Goal: > 92%  Pulmonary toilet: Duo-Nebs     GI/  Costa Catheter Present: Yes  GI Prophylaxis: Protonix (pantoprazole)   Nutrition: Pending   IVFs: none  Bowel Movement: No  Bowel Regimen: Senna and Docusate (Colace)  Insulin: continue ISS    ANTIBIOTICS  Antibiotics:  Cefepime  Vancomycin    T/L/D  Tubes: Nasogastric Tube  Lines: Arterial Line, Central Line and Demetris  Drains: None    SPECIAL EQUIPMENT  CRRT    DISPOSITION  Stay in ICU    CRITICAL CARE CONSULTANT NOTE  I provided a face-to-face bedside physician/patient encounter, greater than the usual and customary amount normally needed, due to the high complexity of medical decision-making required. I reviewed and interpreted patient data including clinical events, labs, images, vital signs, I/O's, and examined patient. I have actively participated in multi-disciplinary discussions (nursing staff, radiology, laboratory) regarding the case in formulating an optimal therapeutic plan, and effecting a management strategy for this patient. NOTE OF PERSONAL INVOLVEMENT IN CARE   This patient has a high probability of imminent, clinically significant deterioration, which requires the highest level of preparedness to intervene urgently.  I participated in the decision-making and personally managed, or directed the management of, a myriad of life and organ supporting interventions which required my frequent-interval clinical reassessments, in order to treat or prevent imminent deterioration. I personally spent 45 minutes of critical care time. This is time spent at this critically ill patient's bedside actively involved in patient care as well as the coordination of care and discussions with the patient's family. This does not include any procedural time which has been billed separately.     Robin Childers MSN, BSN, BS, FNP-BC, NP-C, CCRN-CMC   Staff Intensivist Nurse Practitioner  [facility] Critical Care  12/6/2020

## 2020-12-06 NOTE — PROGRESS NOTES
Bedside shift change report given to Yakov Quiroga RN by JUAN Torrez RN. Report included the following information SBAR.

## 2020-12-06 NOTE — CONSULTS
Infectious Disease Consult    Today's Date: 12/6/2020   Admit Date: 12/5/2020    Impression:   · Shock  · SORAYA  · AMS  · Debility   · ? MRSA bacteremia     Plan:   · IV antibiotic therapy  · Follow up cultures and studies    Anti-infectives:   · Vancomycin   · Cefepime     Subjective:   Date of Consultation:  December 6, 2020  Referring Physician: Ron Olsen NP    Patient is a 76 y.o. female admitted with picture of sepsis/shock. She is unable to provide history at the current time. She is admitted from an outside hospital with above diagnoses. She is a resident of a SNF and has had a negative COVID test, reportedly. She has cultures pending, is on broad antibiotic coverage and we are asked to see her in consultation. Patient Active Problem List   Diagnosis Code    AMS (altered mental status) R41.82    Septic shock ([facility] [address] A41.9, R65.21    Acute on chronic renal failure (H[facility]) N17.9, N18.9     Past Medical History:   Diagnosis Date    Chronic obstructive pulmonary disease ([facility] [address]     Hypertension     Sarcoidosis       No family history on file. Social History     Tobacco Use    Smoking status: Never Smoker    Smokeless tobacco: Never Used   Substance Use Topics    Alcohol use: No     No past surgical history on file. Prior to Admission medications    Medication Sig Start Date End Date Taking? Authorizing Provider   fluticasone propion-salmeteroL (Advair Diskus) 250-50 mcg/dose diskus inhaler Take 1 Puff by inhalation every twelve (12) hours. Estelle Lanier MD   albuterol (PROVENTIL VENTOLIN) 2.5 mg /3 mL (0.083 %) nebu by Nebulization route. Estelle Lanier MD   allopurinoL (ZYLOPRIM) 100 mg tablet Take  by mouth daily. Estelle Lanier MD   saliva substitution (Biotene Dry Mouth Oral Rinse) mwsh mouthwash 15 mL by Mucous Membrane route as needed. Estelle Lanier MD   bisacodyL (DULCOLAX) 5 mg EC tablet Take 5 mg by mouth daily as needed for Constipation.     Estelle Lanier MD   capsaicin (CAPZASIN-HP) 0.1 % topical cream Apply  to affected area three (3) times daily. Estelle Lanier MD   cholecalciferol (VITAMIN D3) 25 mcg (1,000 unit) cap Take  by mouth daily. Estelle Lanier MD   ipratropium-albuteroL (Combivent Respimat)  mcg/actuation inhaler Take 1 Puff by inhalation every six (6) hours. Estelle Lanier MD   rosuvastatin (Crestor) 20 mg tablet Take 20 mg by mouth nightly. Estelle Lanier MD   folic acid (FOLVITE) 1 mg tablet Take  by mouth daily. Estelle Lanier MD   alendronate (Fosamax) 70 mg tablet Take  by mouth. Estelle Lanier MD   gabapentin (NEURONTIN) 300 mg capsule Take 300 mg by mouth three (3) times daily. Estelle Lanier MD   metoprolol tartrate (LOPRESSOR) 25 mg tablet Take  by mouth two (2) times a day. Estelle Lanier MD   multivitamin (ONE A DAY) tablet Take 1 Tab by mouth daily. Estelle Lanier MD   nystatin (MYCOSTATIN) powder Apply  to affected area four (4) times daily. Estelle Lanier MD   oxyCODONE-acetaminophen (PERCOCET) 5-325 mg per tablet Take  by mouth every four (4) hours as needed for Pain. Estelle Lanier MD   PARoxetine (PaxiL) 20 mg tablet Take  by mouth daily. Estelle Lanier MD   pantoprazole (Protonix) 40 mg tablet Take 40 mg by mouth daily. Estelle Lanier MD   montelukast (Singulair) 10 mg tablet Take 10 mg by mouth daily. Estelle Lanier MD   tamsulosin (FLOMAX) 0.4 mg capsule Take 0.4 mg by mouth daily. Estelle Lanier MD   tiZANidine (ZANAFLEX) 2 mg tablet Take  by mouth. Estelle Lanier MD   budesonide-formoterol (SYMBICORT) 160-4.5 mcg/actuation HFA inhaler Take 2 Puffs by inhalation two (2) times a day. Provider, Historical   denosumab (PROLIA) 60 mg/mL injection 60 mg by SubCUTAneous route. Provider, Historical   predniSONE (DELTASONE) 20 mg tablet Take  by mouth daily (with breakfast). Provider, Historical       No Known Allergies     Review of Systems:  Review of systems not obtained due to patient factors.     Objective:     Visit Vitals  /61 Pulse (!) 122   Temp 97.4 °F (36.3 °C)   Resp 21   Wt 83 kg (182 lb 15.7 oz)   SpO2 92%   BMI 33.47 kg/m²     Temp (24hrs), Av.3 °F (35.7 °C), Min:95.7 °F (35.4 °C), Max:97.4 °F (36.3 °C)       Lines:  Central Venous Catheter:   , Hemodialysis Catheter:    and Peripheral IV:       Physical Exam:  Lungs:  clear to auscultation bilaterally  Heart:  regular rate and rhythm  Abdomen:  soft, non-tender.  Bowel sounds normal. No masses,  no organomegaly  Skin:  Few lesions    Data Review:     CBC:  Recent Labs     20  0430 20  1230   WBC 5.2 10.5   GRANS 74 67   MONOS 3* 14*   EOS 0 1   ANEU 3.8 7.6   ABL 1.1 0.9   HGB 9.6* 9.2*   HCT 31.4* 30.2*    179       BMP:  Recent Labs     20  0205 20  0430 20  1230   CREA 2.89* 2.88* 2.76*   BUN 43* 43* 45*    141 137   K 5.0 4.3 Hemolyzed, recollect requested   * 112* 105   CO2 19* 19* 23   AGAP 9 10 9   GLU 96 174* 405*       LFTS:  Recent Labs     20  0205 20  0430 20  1230   TBILI 0.8 0.5 0.7   ALT 54 46 48   * 155* 154*   TP 5.7* 5.6* 5.5*   ALB 2.0* 1.5* 1.8*       Microbiology:     All Micro Results     Procedure Component Value Units Date/Time    CULTURE, BLOOD #1 [[id number]] Collected:  20    Order Status:  Completed Specimen:  Blood Updated:  20    RESPIRATORY VIRAL PANEL, PCR [[id number]]     Order Status:  Sent Specimen:  Sputum     CULTURE, BLOOD #2 [[id number]]     Order Status:  Sent Specimen:  Blood           Imaging:   Reviewed     Signed By: Raisa Ramirez MD     2020

## 2020-12-06 NOTE — PROGRESS NOTES
Critical Care update    Updated patient's daughter at bedside who stated that she will talk with and update niece and sister. Daughter states concern of patient's care and SNF that let to hospital admission. Will have case management follow for discharge planning. Confirmed code status DNR/DNI. Patient on CRRT pulling net neg 50cc/hr. ID consulted for continued management. Trops trending down, Likely elevation due to increased cardiac demand from septic shock. Discontinue serials. Get EKG today.     Jessica Villasenor AGAPr[facility]-BC     [address] [facility]

## 2020-12-06 NOTE — PROCEDURES
[facility]      Procedure Note - Arterial Access:   Performed by Leon Kessler NP . Immediately prior to the procedure, the patient was reevaluated and found suitable for the planned procedure and any planned medications. Immediately prior to the procedure a time out was called to verify the correct patient, procedure, equipment, staff, and marking as appropriate. Insertion Date: 12/06/20 [id number]   Procedure Location:  ICU. Condition: Emergency. Consent:  NO.     Method: Seldinger technique. Site Prep: ChloraPrep. Procedure: Arterial Catheter Insertion in Right, Radial Artery   Catheter inserted into a new site. Number of Attempts:  2 Indication: Monitoring and Blood Drawing. Complication None. Performed By:performed the above procedure myself. The procedure was tolerated well.

## 2020-12-06 NOTE — DIALYSIS
[facility]       [phone]        Orders  Mode: CVVH new start @ [address] [place] [address] Avenue: 4K/2.5Ca   Blood Flow Rate: 200ml/min   Prismasol Dose: 25ml/kg/hr = 25ml x 82.9kg = 2072.5 rounded to 2070ml/hr   Factor: 25ml/hr     Metrics  BP/HR: 110/48  119   Access Pressure: -74   Filter Pressure: 111   Return Pressure: 57   TMP: 51   PD: 20   Ultrafiltration Rate: 2070      Comments / Plan:   Patient, code status, orders, line and consent verified. Labs, notes and code status reviewed. Line verified by Xray. Consent obtained by primary RN. Time out completed. At bedside to initiate CVVH per MD order. HF-1000 set up, tested & primed with 1L NS. R IJ temp CVC CDI and dated 12.5.20. No signs or symptoms of infection. Each catheter limb disinfected for 60 seconds with alcohol swabs. Caps removed, CVC scrubbed with Prevantics for 15 seconds followed by 5 second dry time for hospital P&P.  +aspiration/flush x 2 ports. Lines visible and connections secure with blood warmer to return line at 37*C. Running well at 200BFR. Pre/post with primary RN Yessy Mtz RN.

## 2020-12-06 NOTE — PROGRESS NOTES
TRANSFER - IN REPORT:    Verbal report received from Luli Capone RN(name) on Jona Morocho  being received from [facility] ED(unit) for routine progression of care      Report consisted of patients Situation, Background, Assessment and   Recommendations(SBAR). Information from the following report(s) SBAR, Kardex, ED Summary and Cardiac Rhythm Sinus Tachycardia was reviewed with the receiving nurse. Opportunity for questions and clarification was provided. Assessment completed upon patients arrival to unit and care assumed. Per report patient was responsive, saying \"no\" to pain and stating name. Per EMS and on admission patient is lethargic, verbally unresponsive. Responds only to sternal rub/pain to open eyes. Temps 95.9 placed on bairhugger. NP [place] on phone with family Anum Ceja, per family- DNR/DNI- medications only- pink sheet filled out COVID negative. Pt transferred for CRRT. Withdraws to pain in all extremities. Placed on hi flow nasal cannula. Titrated up levophed. Art line inserted. Vasopressin started.        Anum Ceja [phone]

## 2020-12-06 NOTE — PROGRESS NOTES
Pharmacist Note - Vancomycin Dosing    Consult provided for this 76 y.o. female for indication of sepsis. Antibiotic regimen(s): Vancomycin and cefepime  Patient on vancomycin PTA? YES (initiated at [facility])    Recent Labs     20  0430 20  1230   WBC 5.2 10.5   CREA 2.88* 2.76*   BUN 43* 45*     Frequency of BMP: x1 tomorrow (patient transferred to [facility] for CRRT)  Height: 157.5 cm  Weight: 82.9 kg  Est CrCl: CRRT  Temp (24hrs), Av.4 °F (36.3 °C), Min:95.9 °F (35.5 °C), Max:99.5 °F (37.5 °C)    Cultures:   Urine:  pending (from OSH)   Blood:  pending (from OSH)    Goal trough = 15 - 20 mcg/mL    Therapy will be initiated at [facility] with a maintenance dose of 1000 mg IV every 24 hours as the patient is being transferred here for CRRT. Pharmacy to follow patient daily and order levels / make dose adjustments as appropriate. Note:  patient received 2 doses at [facility] prior to transfer. Recommend a trough in the next 24-48 hours.

## 2020-12-06 NOTE — PROGRESS NOTES
Bedside and Verbal shift change report given to [facility] (oncoming nurse) by Andreea Corral (offgoing nurse). Report included the following information SBAR, Kardex, Intake/Output, MAR, Accordion, Recent Results, Med Rec Status, Cardiac Rhythm sinus tach and Alarm Parameters . [facility]: Nephrology MD at bedside, would like to take factor to 50, called family to update on patient status. K 5.0 this AM, pt changed to 2K bath  1342: Pt daughter at bedside speaking with NP Manuel Olguin regarding pt condition. Reviewing chart, orders for troponin, EKG, and procal additionally received  1642: Spoke with intensivist regarding pts BP. BP had been unchanged on 24mcg levo and 0.03U vaso, now with MAPs in low 60s.  Orders for albumin and vaso up to 0.04U received

## 2020-12-07 NOTE — PROGRESS NOTES
responded to pt death. Pt's daughter was with pt when she passed. Wai shared that her sister would be coming to see pt.  provided pastoral listening, support and prayer. Let her know of chaplains continued support. Please contact [phone] Colt giovanni for further support.      [employer] Sandra Prado, MACE   [phone]

## 2020-12-07 NOTE — PROGRESS NOTES
1930: Bedside and Verbal shift change report given to Ifeoma Oglesby RN (oncoming nurse) by Marilyn Parker. Ashlyn Calix RN (offgoing nurse). Report included the following information SBAR, Kardex, ED Summary, Procedure Summary, Intake/Output, MAR, Recent Results, Cardiac Rhythm Sinus Tach and Alarm Parameters . 2000: Shift assessment completed; Pt in bed, eyes open to stimulus. Moves BUE spon; no movement noted in BLE. PERRLA-sluggish 3. No command following. Sinus Tach; BP stable on Levo at 26 mcg and Vaso 0.04 U. High flow in place; sats stable. Edematous. Wilver hugger in place. TF restarted at 30 cc/hr. CRRT continuing with factor of 50. No signs of distress at this time. Will continue to monitor. 2300: CRRT factor decreased to 25 due to pt being maxed on vasopressors and MAP in low 60's. Will continue to monitor. 2336: MAP in low 60's. Levo titrated to 34 mcg at this time. Will continue to monitor. 4872: Sats- 88/89%. FIO2. Titrated to 80 % at this time. Will continue to monitor. 0140Smith Michael, NP updated on pt's condition. NP ordered Rn to pull a factor of 25, give dose of albumin and start pt on EPI.     0231: RRT at bedside. NP ordered RRT to increase FiO2 to 100% and 50 L based off current ABG. 0390: MD aware that pt is maxed on all pressors at this time. Shift note: Pt maxed on Vaso, Levo, and EPI. Albumin given x2. Factor of 25 on CRRT per Ruddy Nassar NP. Increased O2 needs; maxed on high flow. 0745: Bedside and Verbal shift change report given to Chin Arce RN (oncoming nurse) by Ifeoma Oglesby RN (offgoing nurse). Report included the following information SBAR, Kardex, ED Summary, Procedure Summary, Intake/Output, MAR, Recent Results, Cardiac Rhythm Sinus Tach, Alarm Parameters  and Dual Neuro Assessment.

## 2020-12-07 NOTE — PROGRESS NOTES
responded as pt's family had gathered at bedside and had prayer together. Let them know of  continued prayers.  talked with pt's RN. Please contact [phone] Colt Bloom for further support.      [address] Sandra Prado, MACE   [phone]

## 2020-12-07 NOTE — DIALYSIS
CRRT Note [facility]  CRRT discontinued per MD orders, pt transitioned to comfort care. All possible blood (165 ml) returned by primary RN. Old set discarded in red biohazard bag. Machine externally disinfected per policy & procedure and returned to supply closet.

## 2020-12-07 NOTE — DEATH NOTE
Pt Name  Leia Vallejo   Admit date:  12/5/2020   Date and time of death:  12/07/20 @ [phone]   Room Number  7101/01    Medical Record Number  [id number] @ . [facility] hospital   Age  76 y.o. Date of Birth 1955   PCP Juan Carlos Mustafa MD   Attending physician Loi Mejia MD      Code Status  DNR    Patient seen and examined     Mental status   Unresponsive    Pupils Dilated and Fixed    Respiration Nil    Pulse  Absent     Heart Sounds  Absent    Rhythm  Flat line   Family  Notified by Nursing staff    Chaplan Service  Notified by Nursing staff     Death certificate and discharge summary completion remain  Dr. Loi Mejia MD's responsibility.                                  12/7/2020

## 2020-12-07 NOTE — PROGRESS NOTES
Critical Care update    Spoke with Kat Ramos (daughter) at bedside. Would like to transition patient to comfort measures at this time. Awaiting a few more family to come up and see patient before turning off vasopressors. Comfort measures order set completed. Pastoral care notified.     [facility]-BC     [address] [facility]

## 2020-12-07 NOTE — PROGRESS NOTES
0830. Patient rinsed back. MD aware.   1230. Patient transitioned to comfort care  [phone]. Patient , WINSOME Benítez notified - [place] notified.

## 2020-12-07 NOTE — DISCHARGE SUMMARY
[facility]                                                                                         Discharge Summary     Patient: Guerrero Urias       MRN: [id number]       YOB: 1955       Age: 76 y.o. Date of admission:  2020    Date of discharge:  2020    Primary care provider:  Alona Pacheco MD     Admitting provider:  Taylor Wang MD    Discharging provider(s): DWIGHT WinklerC - Staff [address]     Consultations  · IP CONSULT TO NEPHROLOGY  · IP CONSULT TO NEPHROLOGY  · IP CONSULT TO INFECTIOUS DISEASES    Procedures  · 20 CVL placed at OSH  · 20 Temp HD catheter placed at OSH  · 20 Arterial line placement    Discharge Condition. . Admission diagnosis  · Septic shock (HCC) [A41.9, R65.21]  · Septic shock ([address] [A41.9, R65.21]  · Acute on chronic renal failure ([address] [N17.9, N18.9]  · Septic shock ([address] [A41.9, R65.21]  · Acute on chronic renal failure ([address] [N17.9, N18.9]    Please refer to the admission history and physical for details on the presenting problem. Final discharge diagnoses and brief hospital course    · Acute Metabolic Encephalopathy  · Fluid volume overload  · Septic shock, GPC bacteremia (MRSA)  · Mild to moderate mitral valve regurgitation. · Mild to moderate tricuspid valve regurgitation. · Acute hypoxic respiratory failure on HFNC  · Community-acquired pneumonia, bilateral infiltrates on chest x-ray. · Acute renal failure requiring dialysis   · Urinary tract infection  · Hypocalcemia  · Acute on Chronic anemia  · Hx COPD  · Hx Sarcoidosis  · Hx HTN    Cathie Bailey is a 77-year-old female who presented to OSH from SNF with initial complaints of altered mental status and hypotension.  Patient has past medical history for hypertension, renal insufficiency, depression, anxiety, CHF, gout, compression fraction T9-T10, sarcoidosis, hypoxia with respiratory failure, paraplegic, CKD stage III. According to the family the patient had progressively become less interactive and then obtunded. Family states that the patient's baseline was that she was able to talk and answer questions appropriately. Patient is a paraplegic. Patient was found to have life-threatening hypotension more likely secondary to septic shock. Patient has skin wounds and has a very significant urinary tract infection. Transferred to [facility] for CRRT and higher level of care. Hypothermic upon admission so she was placed on [employer]. Started on vasopressors and CRRT . Arterial line was placed, ID re-consulted. MRSA bacteremia. Patient on Vanc and Cefepime. Family discussion in which patient was made DNR/DNI upon admission. Patient continued to decline. On HFNC and maxed out on three vasopressors. Unable to continue CRRT due to hemodynamic instability. Notified family of worsening status and daughter decided to make patient comfort measures only. Patient passed with family at bedside on 12/07/2020 @1245. Pastoral care notified. Recent Labs     12/07/20 0442 12/05/20  0430   WBC 13.6* 5.2   HGB 6.7* 9.6*   HCT 22.7* 31.4*   * 197     Recent Labs     12/07/20  0442 12/06/20  0205 12/05/20  0430    142 141   K 3.5 5.0 4.3    114* 112*   CO2 23 19* 19*   BUN 12 43* 43*   CREA 0.97 2.89* 2.88*   * 96 174*   CA 8.6 6.8* 7.5*   MG 2.1 1.9  --    PHOS 2.2* 5.4*  --      Recent Labs     12/07/20 0442 12/06/20  0205 12/05/20  0430   AP  --  252* 155*   TP  --  5.7* 5.6*   ALB 3.0* 2.0* 1.5*   GLOB  --  3.7 4.1*   AML  --  237*  --    LPSE  --  54*  --      Recent Labs     12/06/20  0205   INR 1.2*   PTP 12.4*      No results for input(s): FE, TIBC, PSAT, FERR in the last 72 hours. Recent Labs     12/04/20  1530 12/04/20  1451   PH 7.302* 7.19*   PCO2 39 27*   PO2 134* 20*     No results for input(s): CPK, CKMB in the last 72 hours.     No lab exists for component: TROPONINI  No components found for: [place]    Pertinent imaging studies:  Study Result 12/06/2020    INDICATION: Respiratory failure     COMPARISON: 12/5/2020     FINDINGS: Single AP portable view of the chest obtained at 4:02 AM demonstrates  no change in position of the lines and tubes. The cardiomediastinal silhouette  is stable. Diffuse bilateral airspace disease is not significantly changed. No  pneumothorax is seen. Small right pleural effusion is unchanged.     IMPRESSION: Stable appearance of the chest.     CT Head 12/4/2020    Impression: Unremarkable head CT without contrast.  No infarct, mass, or   hemorrhage. No skull fracture or traumatic soft tissue swelling. Please see full  report.  ---------------------------------    Chronic Diagnoses:    Problem List as of 12/7/2020 Date Reviewed: 5/31/2017          Codes Class Noted - Resolved    Acute on chronic renal failure ([facility] ICD-10-CM: N17.9, N18.9  ICD-9-CM: 584.9, 585.9  12/6/2020 - Present        * (Principal) Septic shock ([facility] ICD-10-CM: A41.9, R65.21  ICD-9-CM: 038.9, 785.52, 995.92  12/5/2020 - Present        AMS (altered mental status) ICD-10-CM: R41.82  ICD-9-CM: 780.97  12/4/2020 - Present              Time spent on discharge related activities today greater than 30 minutes.       Signed:      CLARK Olivarez   Staff [facility]    12/7/2020   1:00 PM     Andreas Benjamin MD   Attending    Cc: Abhay Drummond MD

## 2020-12-07 NOTE — PROGRESS NOTES
Critical Care Update    Called and updated patent's daughter on patient status. Much higher vasopressor requirements. Now maxed on Levophed, Vasopressin, and Epinephrine gtt with systolic in the 48'I on arterial line. Patient is a DNR/DNI. Starting to have irregular shallow breathing on HFNC. H/H is low and patient needs a unit of PRBCs. At this juncture we would consider intubation, but due to code status called family and they are consider transitioning to comfort measures. Daughter and family on way to see patient this am. CRRT stopped as patient is unable to tolerate due to hemodynamic instability.      Kristen Situ AGACNP-BC     [address] [facility]

## 2020-12-07 NOTE — PROGRESS NOTES
Spiritual Care Assessment/Progress Note  [facility]      NAME: Arlet Ellis      MRN: [id number]  AGE: 76 y.o. SEX: female  Mormon Affiliation: No preference   Language: English     12/7/2020     Total Time (in minutes): 22     Spiritual Assessment begun in [facility] through conversation with:         [x]Patient        [x] Family    [] Friend(s)        Reason for Consult: End-of-life support     Spiritual beliefs: (Please include comment if needed)     [x] Identifies with a radha tradition:         [] Supported by a radha community:            [] Claims no spiritual orientation:           [] Seeking spiritual identity:                [] Adheres to an individual form of spirituality:           [] Not able to assess:                           Identified resources for coping:      [x] Prayer                               [] Music                  [] Guided Imagery     [x] Family/friends                 [] Pet visits     [] Devotional reading                         [] Unknown     [] Other:                                               Interventions offered during this visit: (See comments for more details)          Family/Friend(s):  Affirmation of emotions/emotional suffering, Affirmation of radha, Coping skills reviewed/reinforced, Iconic (affirming the presence of God/Higher Power), Normalization of emotional/spiritual concerns, Prayer (assurance of)     Plan of Care:     [x] Support spiritual and/or cultural needs    [] Support AMD and/or advance care planning process      [] Support grieving process   [] Coordinate Rites and/or Rituals    [] Coordination with community clergy   [] No spiritual needs identified at this time   [] Detailed Plan of Care below (See Comments)  [] Make referral to Music Therapy  [] Make referral to Pet Therapy     [] Make referral to Addiction services  [] Make referral to [facility]  [] Make referral to Spiritual Care Partner  [] No future visits requested [x] Follow up upon further referrals     Comments:  for initial visit. Pt's daughter Mickey Baltazar was at bedside. She shared that it's hard because she felt like she felt her mom had been off before coming to the hospital. Wai shared that her family was coming and would like prayer when all are present.  talked with pt's RN, please contact [phone] [facility] when family arrives.  provided pastoral listening, support, and prayer.  will follow up.      [address] Yessica Prado M.Div, [facility]   [phone]

## 2020-12-07 NOTE — PROGRESS NOTES
Renal Progress Note    NAME:  Lakshmi Amaro   :   1955   MRN:   [id number]     Date/Time:  2020 8:22 AM      Assessment:   1. Acute Kidney Injury --> on CVVH  2. Septic Shock --> on pressor support  3. CAP  4. Fluid Overload  5. Encephalopathy  6. Hypophosphatemia       Plan:   1. Continue CRRT for now. 2. Continue pressor support. 3. Follow lytes  4. Avoid NSAIDs + IV contrast  5. Dose meds for her GFR. 6.   Replete Phos  7. Follow lytes. It seems her family is leaning towards comfort care. This is not unreasonable. Prognosis seems poor. Subjective:       F/U SORAYA, CVVH ---> 2020      Ms. Brynn Castro remains on CVVH and max pressor support. She remains hypotensive despite these measures.       Review of Systems:  Y  N       Y  N  []         []          Fever/chills                                               []         []          Chest Pain  []         []          Cough                                                       []         []          Diarrhea   []         []          Sputum                                                     []         []          Constipation  []         []          SOB/WADE                                                []         []          Nausea/Vomit  []         []          Abd Pain                                                    []         []          Tolerating PT  []         []          Dysuria                                                      []         []          Tolerating Diet     [x]        Unable to obtain  ROS due to  []        mental status change  []        sedated   []        intubated    Medications reviewed:  Current Facility-Administered Medications   Medication Dose Route Frequency    EPINEPHrine (ADRENALIN) 5 mg in 0.9% sodium chloride 250 mL infusion  1-10 mcg/min IntraVENous TITRATE    potassium phosphate 20 mmol in 0.9% sodium chloride 250 mL infusion   IntraVENous ONCE    bicarbonate dialysis (PRISMASOL) BG K 4/Ca 2.5 5000 ml solution  1,000 mL/hr Extracorporeal DIALYSIS CONTINUOUS    sodium chloride (NS) flush 5-40 mL  5-40 mL IntraVENous Q8H    sodium chloride (NS) flush 5-40 mL  5-40 mL IntraVENous PRN    acetaminophen (TYLENOL) tablet 650 mg  650 mg Oral Q6H PRN    Or    acetaminophen (TYLENOL) suppository 650 mg  650 mg Rectal Q6H PRN    ondansetron (ZOFRAN) injection 4 mg  4 mg IntraVENous Q6H PRN    famotidine (PF) (PEPCID) 20 mg in 0.9% sodium chloride 10 mL injection  20 mg IntraVENous Q24H    albuterol-ipratropium (DUO-NEB) 2.5 MG-0.5 MG/3 ML  3 mL Nebulization Q4H PRN    cefepime (MAXIPIME) 2 g in 0.9% sodium chloride (MBP/ADV) 100 mL MBP  2 g IntraVENous Q12H    docusate (COLACE) 50 mg/5 mL oral liquid 100 mg  100 mg Oral BID    sennosides (SENOKOT) 8.8 mg/5 mL syrup 8.8 mg  5 mL Oral QPM    morphine injection 2 mg  2 mg IntraVENous Q4H PRN    vancomycin (VANCOCIN) 1,000 mg in 0.9% sodium chloride 250 mL (VIAL-MATE)  1,000 mg IntraVENous Q24H    Vancomycin dosing per pharmacy   Other Rx Dosing/Monitoring    NOREPINephrine (LEVOPHED) 32 mg in 5% dextrose 250 mL infusion  0.5-30 mcg/min IntraVENous TITRATE    vasopressin (VASOSTRICT) 20 Units in 0.9% sodium chloride 100 mL infusion  0.04 Units/min IntraVENous TITRATE        Objective:   Vitals:  Visit Vitals  BP (!) 102/35   Pulse 84   Temp 97 °F (36.1 °C)   Resp 15   Wt 83 kg (182 lb 15.7 oz)   SpO2 100%   BMI 33.47 kg/m²     Temp (24hrs), Av.9 °F (36.1 °C), Min:96 °F (35.6 °C), Max:97.4 °F (36.3 °C)    O2 Flow Rate (L/min): 50 l/min O2 Device: Heated, Hi flow nasal cannula    Last 24hr Input/Output:    Intake/Output Summary (Last 24 hours) at 2020 8150  Last data filed at 2020 0700  Gross per 24 hour   Intake 2542.09 ml   Output 2968 ml   Net -425.91 ml        PHYSICAL EXAM:    Seen in ICU - Bed 1      General:    Ill-looking lady lying in bed. Head:   Normocephalic.     Lungs:   Decreased breath sounds in the lower zones.    Heart:   No S3  Gallop , No pericardial rub  . Abdomen:    Bowel sounds  - present     Extremities: Oedema ++    Neurologic: Non-verbal.    .        []        Telemetry Reviewed     []        NSR []        PAC/PVCs   []        Afib  []        Paced   []        NSVT   []        Costa []        NGT  []        Intubated on vent    Lab Data Reviewed:    Recent Results (from the past 24 hour(s))   TROPONIN I    Collection Time: 12/06/20  2:15 PM   Result Value Ref Range    Troponin-I, Qt. 0.36 (H) <0.05 ng/mL   PROCALCITONIN    Collection Time: 12/06/20  2:15 PM   Result Value Ref Range    Procalcitonin 55.89 ng/mL   EKG, 12 LEAD, INITIAL    Collection Time: 12/06/20  2:56 PM   Result Value Ref Range    Ventricular Rate 124 BPM    Atrial Rate 124 BPM    P-R Interval 124 ms    QRS Duration 84 ms    Q-T Interval 280 ms    QTC Calculation (Bezet) 402 ms    Calculated P Axis 1 degrees    Calculated R Axis -8 degrees    Calculated T Axis 131 degrees    Diagnosis       Sinus tachycardia  Cannot rule out Anterior infarct , age undetermined  No previous ECGs available     ALBUMIN    Collection Time: 12/07/20  4:42 AM   Result Value Ref Range    Albumin 3.0 (L) 3.5 - 5.0 g/dL   METABOLIC PANEL, BASIC    Collection Time: 12/07/20  4:42 AM   Result Value Ref Range    Sodium 139 136 - 145 mmol/L    Potassium 3.5 3.5 - 5.1 mmol/L    Chloride 105 97 - 108 mmol/L    CO2 23 21 - 32 mmol/L    Anion gap 11 5 - 15 mmol/L    Glucose 146 (H) 65 - 100 mg/dL    BUN 12 6 - 20 MG/DL    Creatinine 0.97 0.55 - 1.02 MG/DL    BUN/Creatinine ratio 12 12 - 20      GFR est AA >60 >60 ml/min/1.73m2    GFR est non-AA 57 (L) >60 ml/min/1.73m2    Calcium 8.6 8.5 - 10.1 MG/DL   CBC WITH AUTOMATED DIFF    Collection Time: 12/07/20  4:42 AM   Result Value Ref Range    WBC 13.6 (H) 3.6 - 11.0 K/uL    RBC 2.20 (L) 3.80 - 5.20 M/uL    HGB 6.7 (L) 11.5 - 16.0 g/dL    HCT 22.7 (L) 35.0 - 47.0 %    .2 (H) 80.0 - 99.0 FL    MCH 30.5 26.0 - 34.0 PG MCHC 29.5 (L) 30.0 - 36.5 g/dL    RDW 19.9 (H) 11.5 - 14.5 %    PLATELET 285 (L) 560 - 400 K/uL    MPV 10.0 8.9 - 12.9 FL    NRBC 1.4 (H) 0  WBC    ABSOLUTE NRBC 0.19 (H) 0.00 - 0.01 K/uL    NEUTROPHILS 58 32 - 75 %    LYMPHOCYTES 14 12 - 49 %    MONOCYTES 7 5 - 13 %    EOSINOPHILS 1 0 - 7 %    BASOPHILS 0 0 - 1 %    METAMYELOCYTES 11 (H) 0 %    MYELOCYTES 6 (H) 0 %    PROMYELOCYTES 3 (H) 0 %    IMMATURE GRANULOCYTES 0 %    ABS. NEUTROPHILS 7.9 1.8 - 8.0 K/UL    ABS. LYMPHOCYTES 1.9 0.8 - 3.5 K/UL    ABS. MONOCYTES 1.0 0.0 - 1.0 K/UL    ABS. EOSINOPHILS 0.1 0.0 - 0.4 K/UL    ABS. BASOPHILS 0.0 0.0 - 0.1 K/UL    ABS. IMM. GRANS. 0.0 K/UL    DF MANUAL      PLATELET COMMENTS Large Platelets      RBC COMMENTS ANISOCYTOSIS  1+        RBC COMMENTS MACROCYTOSIS  1+        RBC COMMENTS YESSI CELLS  PRESENT       MAGNESIUM    Collection Time: 12/07/20  4:42 AM   Result Value Ref Range    Magnesium 2.1 1.6 - 2.4 mg/dL   PHOSPHORUS    Collection Time: 12/07/20  4:42 AM   Result Value Ref Range    Phosphorus 2.2 (L) 2.6 - 4.7 MG/DL   PROCALCITONIN    Collection Time: 12/07/20  4:42 AM   Result Value Ref Range    Procalcitonin 43.06 ng/mL   LACTIC ACID    Collection Time: 12/07/20  4:42 AM   Result Value Ref Range    Lactic acid 0.9 0.4 - 2.0 MMOL/L   RBC, ALLOCATE    Collection Time: 12/07/20  6:45 AM   Result Value Ref Range    HISTORY CHECKED?  Historical check performed    TYPE & SCREEN    Collection Time: 12/07/20  6:52 AM   Result Value Ref Range    Crossmatch Expiration 12/10/2020,2359     ABO/Rh(D) Nate Parker POSITIVE     Antibody screen NEG     Unit number [id number]     Blood component type RC LR     Unit division 00     Status of unit ALLOCATED     Crossmatch result Compatible        Total time spent with patient:  []        15   []        25   []        35   []         __ minutes    []        Critical Care Provided    Care Plan discussed with:      D/W ICU Nursing      []        Patient   []        Family    [] Care Manager   []        Consultant/Specialist :      []          >50% of visit spent in counseling and coordination of care   (Discussed []        CODE status,  []        Care Plan, []        D/C Planning)    ___________________________________________________    Attending Physician: Senait Islas MD

## 2020-12-08 LAB
ARTERIAL PATENCY WRIST A: ABNORMAL
ARTERIAL PATENCY WRIST A: YES
BASE DEFICIT BLDA-SCNC: 13.9 MMOL/L
BASE DEFICIT BLDA-SCNC: 7 MMOL/L
BDY SITE: ABNORMAL
BDY SITE: ABNORMAL
FIO2 ON VENT: 90 %
GAS FLOW.O2 O2 DELIVERY SYS: 4 L/MIN
GAS FLOW.O2 O2 DELIVERY SYS: 60 L/MIN
HCO3 BLDA-SCNC: 18 MMOL/L (ref 22–26)
HCO3 BLDA-SCNC: 23 MMOL/L (ref 22–26)
PCO2 BLDA: 66 MMHG (ref 35–45)
PCO2 BLDA: 72 MMHG (ref 35–45)
PH BLDA: 7.02 [PH] (ref 7.35–7.45)
PH BLDA: 7.16 [PH] (ref 7.35–7.45)
PO2 BLDA: 75 MMHG (ref 80–100)
PO2 BLDA: 76 MMHG (ref 80–100)
SAO2 % BLD: 87 % (ref 92–97)
SAO2 % BLD: 90 % (ref 92–97)
SAO2% DEVICE SAO2% SENSOR NAME: ABNORMAL
SAO2% DEVICE SAO2% SENSOR NAME: ABNORMAL
SPECIMEN SITE: ABNORMAL
SPECIMEN SITE: ABNORMAL

## 2020-12-09 LAB
ABO + RH BLD: NORMAL
BLD PROD TYP BPU: NORMAL
BLOOD GROUP ANTIBODIES SERPL: NORMAL
BPU ID: NORMAL
CROSSMATCH RESULT,%XM: NORMAL
SPECIMEN EXP DATE BLD: NORMAL
STATUS OF UNIT,%ST: NORMAL
UNIT DIVISION, %UDIV: 0

## 2020-12-11 LAB
BACTERIA SPEC CULT: ABNORMAL
SERVICE CMNT-IMP: ABNORMAL

## 2020-12-15 NOTE — DISCHARGE SUMMARY
Discharge Summary PATIENT ID: Milagros Renteria MRN: [id number] YOB: 1955 DATE OF ADMISSION: 12/4/2020 11:52 AM   
DATE OF DISCHARGE:  
PRIMARY CARE PROVIDER: Samuel Beltran MD  
 
ATTENDING PHYSICIAN: Denise Chowdary DISCHARGING PROVIDER: Denise Chowdary CONSULTATIONS: IP CONSULT TO NEPHROLOGY 
IP CONSULT TO CARDIOLOGY PROCEDURES/SURGERIES: * No surgery found * ADMITTING DIAGNOSES:   
Patient Active Problem List  
 Diagnosis Date Noted  Acute on chronic renal failure ([facility] [address] 12/06/2020  Septic shock ([facility] [address] 12/05/2020  AMS (altered mental status) 12/04/2020 DISCHARGE DIAGNOSES / PLAN:   
 
  
Active Hospital Problems Diagnosis Date Noted  AMS (altered mental status) 12/04/2020 1. ADDITIONAL CARE RECOMMENDATIONS:  
 
 
 
DISCHARGE MEDICATIONS: 
Discharge Medication List as of 12/5/2020  9:42 PM  
  
 
 
 
NOTIFY YOUR PHYSICIAN FOR ANY OF THE FOLLOWING:  
Fever over 101 degrees for 24 hours. Chest pain, shortness of breath, fever, chills, nausea, vomiting, diarrhea, change in mentation, falling, weakness, bleeding. Severe pain or pain not relieved by medications. Or, any other signs or symptoms that you may have questions about. DISPOSITION: 
x  Home With: 
 OT  PT  HH  RN  
  
 Long term SNF/Inpatient Rehab Independent/assisted living Hospice Other:  
 
 
PATIENT CONDITION AT DISCHARGE: Stable PHYSICAL EXAMINATION AT DISCHARGE: 
General:          Alert, cooperative, no distress, appears stated age. HEENT:           Atraumatic, anicteric sclerae, pink conjunctivae No oral ulcers, mucosa moist, throat clear, dentition fair Neck:               Supple, symmetrical 
Lungs:             Clear to auscultation bilaterally. No Wheezing or Rhonchi. No rales. Chest wall:      No tenderness  No Accessory muscle use. Heart:              Regular  rhythm,  No  murmur   No edema Abdomen:        Soft, non-tender. Not distended. Bowel sounds normal 
Extremities:     No cyanosis. No clubbing,   
                        Skin turgor normal, Capillary refill normal 
Skin:                Not pale. Not Jaundiced  No rashes Psych:             Not anxious or agitated. Neurologic:      Alert, moves all extremities, answers questions appropriately and responds to commands XR CHEST PORT Final Result IMPRESSION: Placement of enteric tube with tip overlying the distal stomach  
otherwise unchanged. IR Wallace Final Result Impression:   
Successful placement of a double-lumen non-tunneled hemodialysis catheter. The  
catheter is ready for use. IR INSERT NON TUNL CVC OVER 5 YRS Final Result Impression:   
Successful placement of a double-lumen non-tunneled hemodialysis catheter. The  
catheter is ready for use. XR CHEST PORT Final Result FINDINGS/IMPRESSION:  
Radiograph is limited by patient's body habitus and AP portable technique. Support catheters right-sided approach, distal aspect of each terminate near the  
cavoatrial junction. Examination negative for pneumothorax. Remainder stable. XR CHEST PORT Final Result FINDINGS/IMPRESSION:  
Radiograph is limited by patient's body habitus and AP portable technique. Support catheter right-sided approach, distal aspect projects over the right  
atrium. Negative for pneumothorax. Calcified mediastinal and hilar lymph nodes consistent with sequela of prior  
granulomatous insult. Reticular opacity present throughout the lungs consistent with pre-existing  
interstitial lung disease. CT HEAD WO CONT Final Result Impression: Unremarkable head CT without contrast.  No infarct, mass, or   
hemorrhage. No skull fracture or traumatic soft tissue swelling. Please see full  
report. XR HAND LT MIN 3 V Final Result IMPRESSION: Advanced osteoarthritis. XR CHEST PORT Final Result US RETROPERITONEUM COMP    (Results Pending) No results found for this or any previous visit (from the past 24 hour(s)). HOSPITAL COURSE: 
Patient is a 76y.o. year old female history of COPD hypertension sarcoidosis came from the nursing home altered mental status history of questionable stroke came to the ER with altered mental status from the nursing home when came in patient was hypotensive seen by the ER physician given 2 L of IV fluids order blood cultures urine cultures and start on IV Rocephin I saw the patient patient open eyes say few words 
 She was seen by the infectious disease start on IV Zosyn vancomycin for possible sepsis also seen by the pulmonologist because of acute hypoxemic respiratory failure COPD exacerbation started on IV Solu-Medrol nebulizer treatment oxygen protocol and was seen by the nephrologist and plan for CRRT but hospital has noticed machine at this time for CRRT patient was transferred to transfer to [address] [place] [address] physician at [facility] and he accepted the patient Signed: Kassie Prince MD 
12/15/2020 
4:30 PM

## 2023-04-24 NOTE — ED PROVIDER NOTES
EMERGENCY DEPARTMENT HISTORY AND PHYSICAL EXAM      Date: 12/4/2020  Patient Name: Caryn Aschoff      History of Presenting Illness     Chief Complaint   Patient presents with    Altered mental status    High Blood Sugar       History Provided By: Patient    HPI: Caryn Aschoff, 76 y.o. female with a past medical history significant hypertension, renal insufficiency and Depression, anxiety, CHF, gout,compression fx T9-T10, sarcoidosis, hypoxia with respirtory failure, paraplegic, gout, chronic edema CKD stage 3 presents to the ED with EMS from [address] [place] [address] and rehab [place] due to altered mental status and hyperglycemia. Patient is currently alert. satting 95 and above on 2 L of room air which is her baseline. Normally alert and oriented x3. However just alert at this time. There are no other complaints, changes, or physical findings at this time.     PCP: Toñito Daniel MD    Current Facility-Administered Medications   Medication Dose Route Frequency Provider Last Rate Last Dose    sodium chloride (NS) flush 5-10 mL  5-10 mL IntraVENous PRN MD Harika Blake ON 12/5/2020] pantoprazole (PROTONIX) tablet 40 mg  40 mg Oral DAILY Chase Carrillo MD Audelia Huxley Yvonne Curl ON 12/5/2020] PARoxetine (PAXIL) tablet 10 mg  10 mg Oral DAILY Chase Carrillo MD Audelia Huxley [START ON 12/5/2020] tamsulosin (FLOMAX) capsule 0.4 mg  0.4 mg Oral DAILY Chase Carrillo MD        0.9% sodium chloride infusion  75 mL/hr IntraVENous CONTINUOUS Donald Carrillo MD 75 mL/hr at 12/04/20 1902 75 mL/hr at 12/04/20 1902    acetaminophen (TYLENOL) tablet 650 mg  650 mg Oral Q6H PRN Chase Carrillo MD        Or   Harika Whelan acetaminophen (TYLENOL) suppository 650 mg  650 mg Rectal Q6H PRN Chase Carrillo MD        polyethylene glycol (MIRALAX) packet 17 g  17 g Oral DAILY PRN Chase Carrillo MD        ondansetron (ZOFRAN ODT) tablet 4 mg  4 mg Oral Q8H PRN Chase Carrillo MD        Or    ondansetron [facility]) injection 4 mg  4 mg IntraVENous Q6H PRN Larry Carrillo MD        heparin (porcine) injection 5,000 Units  5,000 Units SubCUTAneous Q8H Donald Carrillo MD   5,000 Units at 12/04/20 2202    insulin lispro (HUMALOG) injection   SubCUTAneous AC&HS Larry Carrillo MD        glucose chewable tablet 16 g  4 Tab Oral PRN Larry Carrillo MD        dextrose (D50W) injection syrg 12.5-25 g  25-50 mL IntraVENous PRN Larry Carrillo MD        glucagon (GLUCAGEN) injection 1 mg  1 mg IntraMUSCular PRN Larry Carrillo MD        piperacillin-tazobactam (ZOSYN) 3.375 g in 0.9% sodium chloride (MBP/ADV) 100 mL MBP  3.375 g IntraVENous Q8H Donald Carrillo  mL/hr at 12/04/20 1533 3.375 g at 12/04/20 1533    vancomycin RX Dosing information    Other PRN Gregg Guerrero MD        [START ON 12/5/2020] Vancomycin random level to be done on 12/5/2020 at 0600 am.   Other ONCE Larry Carrillo MD        NOREPINephrine (LEVOPHED) 8 mg in 5% dextrose 250mL (32 mcg/mL) infusion  0.5-16 mcg/min IntraVENous TITRATE Donald Carrillo MD 30 mL/hr at 12/04/20 2001 16 mcg/min at 12/04/20 2001       Past History     Past Medical History:  Past Medical History:   Diagnosis Date    Chronic obstructive pulmonary disease ([facility] [address]     Hypertension     Sarcoidosis        Past Surgical History:  No past surgical history on file. Family History:  No family history on file. Social History:  Social History     Tobacco Use    Smoking status: Never Smoker    Smokeless tobacco: Never Used   Substance Use Topics    Alcohol use: No    Drug use: No       Allergies:  No Known Allergies      Review of Systems     Review of Systems   Unable to perform ROS: Mental status change       Physical Exam     Physical Exam  Exam conducted with a chaperone present. Constitutional:       Appearance: She is well-developed. She is ill-appearing. HENT:      Head: Normocephalic and atraumatic.       Mouth/Throat:      Mouth: Mucous membranes are dry. Eyes:      Extraocular Movements: Extraocular movements intact. Pupils: Pupils are equal, round, and reactive to light. Comments: Periorbital swelling present bilaterally    Neck:      Musculoskeletal: Neck supple. Cardiovascular:      Rate and Rhythm: Normal rate and regular rhythm. Pulses:           Radial pulses are 2+ on the right side and 2+ on the left side. Dorsalis pedis pulses are 1+ on the right side and 1+ on the left side. Heart sounds: Normal heart sounds. Pulmonary:      Effort: Tachypnea present. Breath sounds: Wheezing present. Abdominal:      General: Bowel sounds are normal.      Palpations: Abdomen is soft. Genitourinary:     Labia:         Right: Rash present. Left: Rash present. Comments: Areas of breakdown and open wounds noted on bilateral labia  Musculoskeletal:         General: Swelling and tenderness present. Hands:       Right lower le+ Pitting Edema present. Left lower le+ Pitting Edema present. Legs:    Feet:      Right foot:      Toenail Condition: Right toenails are abnormally thick and long. Left foot:      Toenail Condition: Left toenails are abnormally thick and long. Skin:     Capillary Refill: Capillary refill takes less than 2 seconds. Findings: Erythema and rash present. Neurological:      General: No focal deficit present. Mental Status: She is alert.          Lab and Diagnostic Study Results     Labs -     Recent Results (from the past 12 hour(s))   CBC WITH AUTOMATED DIFF    Collection Time: 20 12:30 PM   Result Value Ref Range    WBC 10.5 3.6 - 11.0 K/uL    RBC 3.02 (L) 3.80 - 5.20 M/uL    HGB 9.2 (L) 11.5 - 16.0 g/dL    HCT 30.2 (L) 35.0 - 47.0 %    .0 (H) 80.0 - 99.0 FL    MCH 30.5 26.0 - 34.0 PG    MCHC 30.5 30.0 - 36.5 g/dL    RDW 20.1 (H) 11.5 - 14.5 %    PLATELET 889 651 - 187 K/uL    MPV 11.4 8.9 - 12.9 FL    NRBC 0.7 (H) 0  WBC ABSOLUTE NRBC 0.07 (H) 0.00 - 0.01 K/uL    NEUTROPHILS 67 32 - 75 %    BAND NEUTROPHILS 5 0 - 6 %    LYMPHOCYTES 9 (L) 12 - 49 %    MONOCYTES 14 (H) 5 - 13 %    EOSINOPHILS 1 0 - 7 %    BASOPHILS 0 0 - 1 %    METAMYELOCYTES 1 (H) 0 %    MYELOCYTES 3 (H) 0 %    IMMATURE GRANULOCYTES 0 %    ABS. NEUTROPHILS 7.6 1.8 - 8.0 K/UL    ABS. LYMPHOCYTES 0.9 0.8 - 3.5 K/UL    ABS. MONOCYTES 1.5 (H) 0.0 - 1.0 K/UL    ABS. EOSINOPHILS 0.1 0.0 - 0.4 K/UL    ABS. BASOPHILS 0.0 0.0 - 0.1 K/UL    ABS. IMM. GRANS. 0.0 K/UL    DF Manual      RBC COMMENTS Microcytosis  1+       METABOLIC PANEL, COMPREHENSIVE    Collection Time: 12/04/20 12:30 PM   Result Value Ref Range    Sodium 137 136 - 145 mmol/L    Potassium Hemolyzed, recollect requested 3.5 - 5.1 mmol/L    Chloride 105 97 - 108 mmol/L    CO2 23 21 - 32 mmol/L    Anion gap 9 5 - 15 mmol/L    Glucose 405 (H) 65 - 100 mg/dL    BUN 45 (H) 6 - 20 mg/dL    Creatinine 2.76 (H) 0.55 - 1.02 mg/dL    BUN/Creatinine ratio 16 12 - 20      GFR est AA 21 (L) >60 ml/min/1.73m2    GFR est non-AA 17 (L) >60 ml/min/1.73m2    Calcium 8.3 (L) 8.5 - 10.1 mg/dL    Bilirubin, total 0.7 0.2 - 1.0 mg/dL    AST (SGOT) Hemolyzed, recollect requested 15 - 37 U/L    ALT (SGPT) 48 12 - 78 U/L    Alk.  phosphatase 154 (H) 45 - 117 U/L    Protein, total 5.5 (L) 6.4 - 8.2 g/dL    Albumin 1.8 (L) 3.5 - 5.0 g/dL    Globulin 3.7 2.0 - 4.0 g/dL    A-G Ratio 0.5 (L) 1.1 - 2.2     BNP    Collection Time: 12/04/20 12:30 PM   Result Value Ref Range    NT pro- (H) <125 pg/mL   TROPONIN I    Collection Time: 12/04/20 12:30 PM   Result Value Ref Range    Troponin-I, Qt. <0.05 <0.05 ng/mL   LACTIC ACID    Collection Time: 12/04/20 12:30 PM   Result Value Ref Range    Lactic acid 2.0 0.4 - 2.0 mmol/L   URINALYSIS W/MICROSCOPIC    Collection Time: 12/04/20 12:30 PM   Result Value Ref Range    Color Yellow/Straw      Appearance Turbid (A) Clear      Specific gravity 1.009 1.003 - 1.030      pH (UA) 5.0 5.0 - 8.0 Protein 100 (A) Negative mg/dL    Glucose >300 (A) Negative mg/dL    Ketone Negative Negative mg/dL    Bilirubin Negative Negative      Blood Moderate (A) Negative      Urobilinogen 0.1 0.1 - 1.0 EU/dL    Nitrites Negative Negative      Leukocyte Esterase Moderate (A) Negative      WBC >100 (H) 0 - 4 /hpf    RBC >100 (H) 0 - 5 /hpf    Bacteria 4+ (A) Negative /hpf   INFLUENZA A & B AG (RAPID TEST)    Collection Time: 12/04/20  2:00 PM   Result Value Ref Range    Influenza A Antigen Negative Negative      Influenza B Antigen Negative Negative     SARS-COV-2    Collection Time: 12/04/20  2:28 PM   Result Value Ref Range    SARS-CoV-2 Please find results under separate order     COVID-19 RAPID TEST    Collection Time: 12/04/20  2:28 PM   Result Value Ref Range    Specimen source Nasopharyngeal      COVID-19 rapid test Not Detected Not Detected     BLOOD GAS, ARTERIAL    Collection Time: 12/04/20  2:51 PM   Result Value Ref Range    pH 7.19 (LL) 7.35 - 7.45      PCO2 27 (L) 35 - 45 mmHg    PO2 20 (LL) 75 - 100 mmHg    O2 SAT 27 (LL) >95 %    BICARBONATE 11 (L) 22 - 26 mmol/L    BASE DEFICIT 16.2 (H) 0 - 2 mmol/L    O2 FLOW RATE 4 L/min    FIO2 36 %    Critical value read back APRIL COST RN    DRUG SCREEN, URINE    Collection Time: 12/04/20  3:15 PM   Result Value Ref Range    AMPHETAMINES Negative Negative      BARBITURATES Negative Negative      BENZODIAZEPINES Negative Negative      COCAINE Negative Negative      METHADONE Negative Negative      OPIATES Negative Negative      PCP(PHENCYCLIDINE) Negative Negative      THC (TH-CANNABINOL) Negative Negative      Drug screen comment        This test is a screen for drugs of abuse in a medical setting only (i.e., they are unconfirmed results and as such must not be used for non-medical purposes, e.g.,employment testing, legal testing). Due to its inherent nature, false positive (FP) and false negative (FN) results may be obtained.  Therefore, if necessary for medical care, Quality 130: Documentation Of Current Medications In The Medical Record: Current Medications Documented recommend confirmation of positive findings by GC/MS. BLOOD GAS, ARTERIAL    Collection Time: 12/04/20  3:30 PM   Result Value Ref Range    pH 7.302 (L) 7.35 - 7.45      PCO2 39 35 - 45 mmHg    PO2 134 (H) 75 - 100 mmHg    O2  >95 %    BICARBONATE 19 (L) 22 - 26 mmol/L    BASE DEFICIT 6.6 (H) 0 - 2 mmol/L    O2 METHOD Nasal Cannula      O2 FLOW RATE 2.0 L/min    SITE Left Brachial     MRSA SCREEN - PCR (NASAL)    Collection Time: 12/04/20  6:50 PM   Result Value Ref Range    MRSA by PCR, Nasal DETECTED (A) Not Detected         Radiologic Studies -   [unfilled]  CT Results  (Last 48 hours)               12/04/20 1338  CT HEAD WO CONT Final result    Impression:  Impression: Unremarkable head CT without contrast.  No infarct, mass, or    hemorrhage. No skull fracture or traumatic soft tissue swelling. Please see full   report. Narrative:  History is altered mental status. Comparison is with the brain MRI of 7/15/2020. TECHNIQUE: Serial axial images were obtained through the brain at 5 mm intervals   without contrast administration. Bone and brain windows are evaluated as well as   sagittal and coronal reformatted images. Dose reduction:  All CT scans at this facility are performed using dose   reduction optimization techniques as appropriate to a performed exam including   the following: automated exposure control, adjustments of the mA and/or kV   according to patient size, or use of iterative reconstruction technique. Findings: The ventricles are midline without extrinsic compression or   dilatation. There is no intra or extra-axial hemorrhage, mass, infarct or edema. No midline shift is identified. The orbits and their contents appear   unremarkable, except the native lenses have been replaced. On bone windows there is no skull fracture or soft tissue swelling. There are   soft tissue excrescences from the right lateral and posterior scalp.  These are   unchanged Quality 431: Preventive Care And Screening: Unhealthy Alcohol Use - Screening: Patient not identified as an unhealthy alcohol user when screened for unhealthy alcohol use using a systematic screening method from the prior exam. No sinusitis or mastoiditis is identified. CXR Results  (Last 48 hours)               12/04/20 1241  XR CHEST PORT Final result    Narrative:  Chest single view. Reduced lung volumes. Nonspecific coarse reticular markings through the lungs,   upper lobe lung predominance; suspect fibrosis. Cardiac and mediastinal   structures magnified on this AP view. Calcified mediastinal lymph nodes present. No pneumothorax or pleural effusion. Medical Decision Making and ED Course   - I am the first and primary provider for this patient. - I reviewed the vital signs, available nursing notes, past medical history, past surgical history, family history and social history. - Initial assessment performed. The patients presenting problems have been discussed, and the staff are in agreement with the care plan formulated and outlined with them. I have encouraged them to ask questions as they arise throughout their visit. Vital Signs-Reviewed the patient's vital signs. Patient Vitals for the past 12 hrs:   Temp Pulse Resp BP SpO2   12/04/20 2107     94 %   12/04/20 2000  (!) 114 29 (!) 82/72 94 %   12/04/20 1900 98.2 °F (36.8 °C)       12/04/20 1856 98.9 °F (37.2 °C) (!) 108 27 (!) 86/75 96 %   12/04/20 1745  (!) 102 27 (!) 93/55 97 %   12/04/20 1600  96 26 93/65 98 %   12/04/20 1523     99 %   12/04/20 1500  (!) 101 26 104/68 99 %   12/04/20 1400  94 22 (!) 76/41 100 %   12/04/20 1300  91 22 (!) 83/56 100 %   12/04/20 1154 98.2 °F (36.8 °C) 96 23 (!) 99/52 99 %         Records Reviewed:  Old Medical Records from [facility]    The patient presents with altered mental status with a differential diagnosis of  cerebral hemorrhage, CVA, CVA/TIA, hypernatremia, hyponatremia, hypoxia and UTI    ED Course:       ED Course as of Dec 04 2218   Fri Dec 04, 2020   1252 Spoke with patient's nurse Natasha from [address] [place] [address] and [facility] reports patient Detail Level: Detailed developed confusion today when assessed around 10:00 am during medication rounds. She reports checking patient's blood sugar at 592 patient had rapid breathing and not following commands. Denies any history of diabetes for patient. Reports patient's mental status baseline is usually alert and oriented x3. Last know well last night at bedtime.     [AM]   18 Dr. Nolvia Cantu at bedside to assess    [AM]   0 Consult with Dr. Rodrigez regarding need for admission. He verbalized understanding.     [AM]      ED Course User Index  [AM] Retia Monday, NP         Provider Notes (Medical Decision Making):      Alert however not oriented at this time. Patient presents with hypotension, tachycardia, suspicious for sepsis. Septic work-up performed at this time patient noted to be hyperglycemic in the 400s, UA positive for UTI, IV Rocephin x1 given CT head within normal results chest x-ray performed patient with history of congestive heart failure and chronic pitting edema. 4+ pitting edema noted to bilateral lower extremities that radiate up to the hip. Periorbital swelling noted as well. Septic protocol fluid hydration initiated. Benefits outweigh the risk at this time patient hypotensive. BNP noted to be elevated at 923, troponin negative, influenza negative, Covid tested performed. Consult placed to admitting provider Dr. Gabriel Villalta. Patient admitted for further evaluation to hospitalist service at this time        Consultations:       Consultations: Dr. Priya Herrera for admissin        Procedures and Critical Care       Performed by: WINSOME Humphrey NP        Disposition     Disposition: Admit      DISCHARGE PLAN:  1. Current Discharge Medication List      CONTINUE these medications which have NOT CHANGED    Details   budesonide-formoterol (SYMBICORT) 160-4.5 mcg/actuation HFA inhaler Take 2 Puffs by inhalation two (2) times a day.     Associated Diagnoses: Bilateral ankle pain, Quality 226: Preventive Care And Screening: Tobacco Use: Screening And Cessation Intervention: Patient screened for tobacco use, is a smoker AND did not receive Cessation Counseling during measurement period or in the six months prior to the measurement period unspecified chronicity; Achilles tendon contracture, left; Ankle swelling, left; Diabetic polyneuropathy associated with type 2 diabetes mellitus (HCC)      denosumab (PROLIA) 60 mg/mL injection 60 mg by SubCUTAneous route. Associated Diagnoses: Bilateral ankle pain, unspecified chronicity; Achilles tendon contracture, left; Ankle swelling, left; Diabetic polyneuropathy associated with type 2 diabetes mellitus (HCC)      predniSONE (DELTASONE) 20 mg tablet Take  by mouth daily (with breakfast). Associated Diagnoses: Bilateral ankle pain, unspecified chronicity; Achilles tendon contracture, left; Ankle swelling, left; Diabetic polyneuropathy associated with type 2 diabetes mellitus ([facility] [address]           2. Follow-up Information    None       3. Return to ED if worse   4. Current Discharge Medication List          Diagnosis     Clinical Impression:   1. Altered mental status, unspecified altered mental status type    2. Hypervolemia, unspecified hypervolemia type    3. Hypotension, unspecified hypotension type    4. Hyperglycemia        Attestations:    Apryl Vargas NP    Please note that this dictation was completed with [employer], the computer voice recognition software. Quite often unanticipated grammatical, syntax, homophones, and other interpretive errors are inadvertently transcribed by the computer software. Please disregard these errors. Please excuse any errors that have escaped final proofreading. Thank you.